# Patient Record
Sex: MALE | Race: BLACK OR AFRICAN AMERICAN | NOT HISPANIC OR LATINO | Employment: FULL TIME | URBAN - METROPOLITAN AREA
[De-identification: names, ages, dates, MRNs, and addresses within clinical notes are randomized per-mention and may not be internally consistent; named-entity substitution may affect disease eponyms.]

---

## 2017-06-05 ENCOUNTER — OFFICE VISIT (OUTPATIENT)
Dept: CARDIOLOGY | Facility: CLINIC | Age: 42
End: 2017-06-05

## 2017-06-05 VITALS
SYSTOLIC BLOOD PRESSURE: 132 MMHG | DIASTOLIC BLOOD PRESSURE: 90 MMHG | HEART RATE: 87 BPM | HEIGHT: 70 IN | WEIGHT: 202.6 LBS | BODY MASS INDEX: 29.01 KG/M2

## 2017-06-05 DIAGNOSIS — R00.2 PALPITATIONS: ICD-10-CM

## 2017-06-05 DIAGNOSIS — R07.9 CHEST PAIN, UNSPECIFIED TYPE: Primary | ICD-10-CM

## 2017-06-05 DIAGNOSIS — R94.31 ABNORMAL EKG: ICD-10-CM

## 2017-06-05 PROBLEM — E66.9 MILD OBESITY: Status: ACTIVE | Noted: 2017-06-05

## 2017-06-05 PROCEDURE — 99214 OFFICE O/P EST MOD 30 MIN: CPT | Performed by: PHYSICIAN ASSISTANT

## 2017-06-05 PROCEDURE — 93225 XTRNL ECG REC<48 HRS REC: CPT | Performed by: INTERNAL MEDICINE

## 2017-06-05 RX ORDER — PANTOPRAZOLE SODIUM 40 MG/1
40 TABLET, DELAYED RELEASE ORAL DAILY
COMMUNITY
End: 2018-05-09

## 2017-06-05 RX ORDER — METOPROLOL SUCCINATE 25 MG/1
25 TABLET, EXTENDED RELEASE ORAL DAILY
COMMUNITY
End: 2018-05-09 | Stop reason: SDUPTHER

## 2017-06-05 NOTE — PROGRESS NOTES
[facility] at [facility] - Office Note  Bereket Palmer Jr.         [address] [facility] [address]  1975   [phone] (home)      LOCATION:  [place] office.  Visit Type: Follow Up.    PCP:  Yehuda Humphrey MD    06/05/17   Bereket Palmer Jr. is a 41 y.o. Single male  currently employed.      Chief Complaint: Follow up on chest pain. Complains of palpitations.  PROBLEM LIST:  1. Chest pains:   a. First episode 2003, with cardiac catheterization by Dr. Sixto Stack showing no evidence of coronary disease, normal LV function.   b. Recurrent episode 2010, with Medical management only. C  c. Recurrent episodes, requiring ER visitation, 11/12/2015: Ruling out for an MI, no change in EKG.   2. Abnormal EKG.   3. H/O Mild obesity with recent unintentional weight loss.  4. Palpitations.      No Known Allergies      Current Outpatient Prescriptions:   •  metoprolol succinate XL (TOPROL-XL) 25 MG 24 hr tablet, Take 25 mg by mouth Daily., Disp: , Rfl:   •  pantoprazole (PROTONIX) 40 MG EC tablet, Take 40 mg by mouth Daily., Disp: , Rfl:     HPI  Mr. Palmer is here today for an overdue follow up on history of chronic atypical chest pain and a chronic abnormal EKG.  From that standpoint, he's done fairly well.  Occasionally he will get a pain in his chest - radiates across both sides, unprovoked by activity or exertion.  His complaint today is palpitations.  He was in to see his PCP and mentioned skipped beats.  They come and go throughout the day. They are irregular but not fast.  His PCP did an EKG showing pre-excitation and he was placed on a beta blocker.  He has only been on the medication for 30 days.  His symptoms are better but have not completely resolved.  He is asking if he should continue on his current dose or have it increased.      The following portions of the patient's history were reviewed in the chart and updated as appropriate: allergies, current  "medications, past family history, past medical history, past social history, past surgical history and problem list.    Review of Systems   Constitution: Negative for weakness and malaise/fatigue.   Cardiovascular: Positive for chest pain, dyspnea on exertion and palpitations. Negative for leg swelling and syncope.   Respiratory: Positive for cough.    Gastrointestinal: Positive for heartburn.         height is 70\" (177.8 cm) and weight is 202 lb 9.6 oz (91.9 kg). His blood pressure is 132/90 and his pulse is 87.   Physical Exam   Constitutional: Vital signs are normal. He appears well-developed and well-nourished.   Cardiovascular: Normal rate, regular rhythm, S1 normal, S2 normal, normal heart sounds, intact distal pulses and normal pulses.    Pulmonary/Chest: Effort normal and breath sounds normal. He has no wheezes. He has no rhonchi. He has no rales.   Abdominal: Soft. Normal appearance and bowel sounds are normal. There is no hepatosplenomegaly.   Neurological: He is alert.         Procedures   EKG from PCP reviewed.  PVCs noted.  Assessment/ Plan     Chest pain, unspecified type:  Overall stable.  Continue with PPI for acid reflux which has helped in the past.  I think the beta blocker will also help.      Abnormal EKG:  I reviewed old EKGs and reviewed the EKG from PCP office, which shows unifocal PVCs.  Continue to monitor.    Palpitations:  I agree with the use of Toprol XL. I would not increase dose at this time.  I reviewed a copy of the EKG performed at PCP office.  I will place a 48 Hour Holter monitor to quantify and evaluation the pre-excitation.  RTC 1 year or sooner PRN.  I will call pt with results of the monitor.        Nevaeh Al PA-C  6/5/2017 9:20 AM      EMR Dragon/Transcription disclaimer:   Much of this encounter note is an electronic transcription/translation of spoken language to printed text. The electronic translation of spoken language may permit erroneous, or at times, " nonsensical words or phrases to be inadvertently transcribed; Although I have reviewed the note for such errors, some may still exist.

## 2017-06-13 ENCOUNTER — OFFICE VISIT (OUTPATIENT)
Dept: CARDIOLOGY | Facility: CLINIC | Age: 42
End: 2017-06-13

## 2017-06-13 DIAGNOSIS — R00.2 PALPITATIONS: ICD-10-CM

## 2017-06-13 PROCEDURE — 93227 XTRNL ECG REC<48 HR R&I: CPT | Performed by: INTERNAL MEDICINE

## 2018-05-03 ENCOUNTER — HOSPITAL ENCOUNTER (EMERGENCY)
Facility: HOSPITAL | Age: 43
Discharge: HOME | End: 2018-05-04
Attending: EMERGENCY MEDICINE | Admitting: EMERGENCY MEDICINE

## 2018-05-03 ENCOUNTER — APPOINTMENT (OUTPATIENT)
Dept: GENERAL RADIOLOGY | Facility: HOSPITAL | Age: 43
End: 2018-05-03

## 2018-05-03 DIAGNOSIS — R10.13 EPIGASTRIC PAIN: ICD-10-CM

## 2018-05-03 DIAGNOSIS — R07.9 CHEST PAIN, UNSPECIFIED TYPE: Primary | ICD-10-CM

## 2018-05-03 DIAGNOSIS — K21.9 CHRONIC GERD: ICD-10-CM

## 2018-05-03 LAB
BASOPHILS # BLD AUTO: 0.03 10*3/MM3 (ref 0–0.2)
BASOPHILS NFR BLD AUTO: 0.5 % (ref 0–1)
DEPRECATED RDW RBC AUTO: 38.4 FL (ref 37–54)
EOSINOPHIL # BLD AUTO: 0.1 10*3/MM3 (ref 0–0.3)
EOSINOPHIL NFR BLD AUTO: 1.7 % (ref 0–3)
ERYTHROCYTE [DISTWIDTH] IN BLOOD BY AUTOMATED COUNT: 12.9 % (ref 11.3–14.5)
HCT VFR BLD AUTO: 49.1 % (ref 38.9–50.9)
HGB BLD-MCNC: 17.4 G/DL (ref 13.1–17.5)
IMM GRANULOCYTES # BLD: 0.04 10*3/MM3 (ref 0–0.03)
IMM GRANULOCYTES NFR BLD: 0.7 % (ref 0–0.6)
LYMPHOCYTES # BLD AUTO: 1.74 10*3/MM3 (ref 0.6–4.8)
LYMPHOCYTES NFR BLD AUTO: 29.9 % (ref 24–44)
MCH RBC QN AUTO: 29.8 PG (ref 27–31)
MCHC RBC AUTO-ENTMCNC: 35.4 G/DL (ref 32–36)
MCV RBC AUTO: 84.1 FL (ref 80–99)
MONOCYTES # BLD AUTO: 0.45 10*3/MM3 (ref 0–1)
MONOCYTES NFR BLD AUTO: 7.7 % (ref 0–12)
NEUTROPHILS # BLD AUTO: 3.49 10*3/MM3 (ref 1.5–8.3)
NEUTROPHILS NFR BLD AUTO: 60.2 % (ref 41–71)
PLATELET # BLD AUTO: 181 10*3/MM3 (ref 150–450)
PMV BLD AUTO: 13.5 FL (ref 6–12)
RBC # BLD AUTO: 5.84 10*6/MM3 (ref 4.2–5.76)
TROPONIN I SERPL-MCNC: 0.03 NG/ML (ref 0–0.07)
WBC NRBC COR # BLD: 5.81 10*3/MM3 (ref 3.5–10.8)

## 2018-05-03 PROCEDURE — 83880 ASSAY OF NATRIURETIC PEPTIDE: CPT | Performed by: EMERGENCY MEDICINE

## 2018-05-03 PROCEDURE — 80053 COMPREHEN METABOLIC PANEL: CPT | Performed by: EMERGENCY MEDICINE

## 2018-05-03 PROCEDURE — 93005 ELECTROCARDIOGRAM TRACING: CPT | Performed by: EMERGENCY MEDICINE

## 2018-05-03 PROCEDURE — 71045 X-RAY EXAM CHEST 1 VIEW: CPT

## 2018-05-03 PROCEDURE — 84484 ASSAY OF TROPONIN QUANT: CPT

## 2018-05-03 PROCEDURE — 99284 EMERGENCY DEPT VISIT MOD MDM: CPT

## 2018-05-03 PROCEDURE — 85025 COMPLETE CBC W/AUTO DIFF WBC: CPT | Performed by: EMERGENCY MEDICINE

## 2018-05-03 PROCEDURE — 83690 ASSAY OF LIPASE: CPT | Performed by: EMERGENCY MEDICINE

## 2018-05-03 RX ORDER — RANITIDINE 150 MG/1
150 TABLET ORAL 2 TIMES DAILY
COMMUNITY
End: 2018-08-28

## 2018-05-03 RX ORDER — ALUMINA, MAGNESIA, AND SIMETHICONE 2400; 2400; 240 MG/30ML; MG/30ML; MG/30ML
15 SUSPENSION ORAL ONCE
Status: COMPLETED | OUTPATIENT
Start: 2018-05-03 | End: 2018-05-03

## 2018-05-03 RX ORDER — SODIUM CHLORIDE 0.9 % (FLUSH) 0.9 %
10 SYRINGE (ML) INJECTION AS NEEDED
Status: DISCONTINUED | OUTPATIENT
Start: 2018-05-03 | End: 2018-05-04 | Stop reason: HOSPADM

## 2018-05-03 RX ORDER — ASPIRIN 81 MG/1
324 TABLET, CHEWABLE ORAL ONCE
Status: COMPLETED | OUTPATIENT
Start: 2018-05-03 | End: 2018-05-03

## 2018-05-03 RX ADMIN — ASPIRIN 81 MG 324 MG: 81 TABLET ORAL at 23:40

## 2018-05-03 RX ADMIN — LIDOCAINE HYDROCHLORIDE 15 ML: 20 SOLUTION ORAL; TOPICAL at 23:41

## 2018-05-03 RX ADMIN — ALUMINUM HYDROXIDE, MAGNESIUM HYDROXIDE, AND DIMETHICONE 15 ML: 400; 400; 40 SUSPENSION ORAL at 23:41

## 2018-05-04 VITALS
OXYGEN SATURATION: 98 % | DIASTOLIC BLOOD PRESSURE: 85 MMHG | HEART RATE: 73 BPM | RESPIRATION RATE: 16 BRPM | BODY MASS INDEX: 28.63 KG/M2 | WEIGHT: 200 LBS | TEMPERATURE: 98 F | SYSTOLIC BLOOD PRESSURE: 128 MMHG | HEIGHT: 70 IN

## 2018-05-04 LAB
ALBUMIN SERPL-MCNC: 4.8 G/DL (ref 3.2–4.8)
ALBUMIN/GLOB SERPL: 1.5 G/DL (ref 1.5–2.5)
ALP SERPL-CCNC: 108 U/L (ref 25–100)
ALT SERPL W P-5'-P-CCNC: 29 U/L (ref 7–40)
ANION GAP SERPL CALCULATED.3IONS-SCNC: 7 MMOL/L (ref 3–11)
AST SERPL-CCNC: 28 U/L (ref 0–33)
BILIRUB SERPL-MCNC: 0.6 MG/DL (ref 0.3–1.2)
BNP SERPL-MCNC: 4 PG/ML (ref 0–100)
BUN BLD-MCNC: 13 MG/DL (ref 9–23)
BUN/CREAT SERPL: 11.8 (ref 7–25)
CALCIUM SPEC-SCNC: 9.7 MG/DL (ref 8.7–10.4)
CHLORIDE SERPL-SCNC: 99 MMOL/L (ref 99–109)
CO2 SERPL-SCNC: 30 MMOL/L (ref 20–31)
CREAT BLD-MCNC: 1.1 MG/DL (ref 0.6–1.3)
GFR SERPL CREATININE-BSD FRML MDRD: 90 ML/MIN/1.73
GLOBULIN UR ELPH-MCNC: 3.3 GM/DL
GLUCOSE BLD-MCNC: 90 MG/DL (ref 70–100)
HOLD SPECIMEN: NORMAL
HOLD SPECIMEN: NORMAL
LIPASE SERPL-CCNC: 31 U/L (ref 6–51)
POTASSIUM BLD-SCNC: 3.6 MMOL/L (ref 3.5–5.5)
PROT SERPL-MCNC: 8.1 G/DL (ref 5.7–8.2)
SODIUM BLD-SCNC: 136 MMOL/L (ref 132–146)
TROPONIN I SERPL-MCNC: 0.01 NG/ML (ref 0–0.07)
WHOLE BLOOD HOLD SPECIMEN: NORMAL
WHOLE BLOOD HOLD SPECIMEN: NORMAL

## 2018-05-04 PROCEDURE — 84484 ASSAY OF TROPONIN QUANT: CPT

## 2018-05-04 PROCEDURE — 93005 ELECTROCARDIOGRAM TRACING: CPT | Performed by: EMERGENCY MEDICINE

## 2018-05-04 RX ORDER — PANTOPRAZOLE SODIUM 40 MG/1
40 TABLET, DELAYED RELEASE ORAL DAILY
Qty: 30 TABLET | Refills: 0 | Status: SHIPPED | OUTPATIENT
Start: 2018-05-04 | End: 2018-05-09 | Stop reason: SDUPTHER

## 2018-05-04 NOTE — DISCHARGE INSTRUCTIONS
Follow up with Dr. Flores as previously scheduled on Monday.    I have included a referral to GI. Make an appointment as soon as possible.

## 2018-05-04 NOTE — ED PROVIDER NOTES
Subjective   42-year-old male presents complaining of chest pain and epigastric pain for the past 4 weeks.  He states that over that time span, he has been experiencing intermittent pain that he describes as sharp in nature.  The pain seems to be worse with eating.  He has a history of GERD for which she has been taking both omeprazole and Zantac over the past several weeks with minimal relief.  He endorses nausea but no vomiting.  No fevers.  No diaphoresis.  He is currently pain-free but states that the pain is quite bothersome when it comes on.  The patient has no cardiac risk factors.  He has an appointment with a cardiologist on Monday.        History provided by:  Patient and spouse  Chest Pain   Pain location:  L chest  Pain quality: burning and sharp    Duration:  4 weeks  Timing:  Intermittent  Progression:  Worsening  Chronicity:  New  Relieved by:  Nothing  Ineffective treatments:  Antacids  Associated symptoms: nausea    Associated symptoms: no fever and no vomiting    Risk factors: male sex    Risk factors: no diabetes mellitus, no high cholesterol, no hypertension, not obese and no smoking    Risk factors comment:  GERD      Review of Systems   Constitutional: Negative for fever.   Cardiovascular: Positive for chest pain.   Gastrointestinal: Positive for nausea. Negative for vomiting.   All other systems reviewed and are negative.      Past Medical History:   Diagnosis Date   • Chest pain 6/5/2017   • GERD (gastroesophageal reflux disease)    • Heart murmur    • Mild obesity 6/5/2017   • Palpitations        No Known Allergies    Past Surgical History:   Procedure Laterality Date   • CARDIAC CATHETERIZATION  2003    normal coronaries       History reviewed. No pertinent family history.    Social History     Social History   • Marital status: Single     Social History Main Topics   • Smoking status: Never Smoker   • Smokeless tobacco: Never Used   • Alcohol use 0.6 oz/week     1 Shots of liquor per week       Comment: monthly   • Drug use: No   • Sexual activity: Defer     Other Topics Concern   • Not on file         Objective   Physical Exam   Constitutional: He is oriented to person, place, and time. He appears well-developed and well-nourished. No distress.   Well-appearing male in no acute distress   HENT:   Head: Normocephalic and atraumatic.   Mouth/Throat: Oropharynx is clear and moist.   Neck: Normal range of motion. No JVD present.   Cardiovascular: Normal rate, regular rhythm and normal heart sounds.  Exam reveals no gallop and no friction rub.    No murmur heard.  Pulmonary/Chest: Effort normal and breath sounds normal. No respiratory distress. He has no wheezes. He has no rales.   Abdominal: Soft. Bowel sounds are normal. He exhibits no distension and no mass. There is no tenderness. There is no guarding.   No focal tenderness or peritoneal signs present, negative Sharif's sign   Musculoskeletal: Normal range of motion. He exhibits no edema.   Neurological: He is alert and oriented to person, place, and time.   Normal gait   Skin: Skin is warm and dry. No rash noted. He is not diaphoretic. No erythema. No pallor.   Psychiatric: He has a normal mood and affect. Judgment and thought content normal.   Nursing note and vitals reviewed.      Procedures         ED Course  ED Course   Comment By Time   42-year-old male presents complaining of intermittent chest pain and epigastric pain for the past 4 weeks.  Of note, the patient has a history of GERD and states that his symptoms seem to be brought on by eating.  He has no cardiac risk factors.  On arrival to the ED, patient well-appearing with benign exam.  Nonsurgical abdomen.  His initial EKG revealed normal sinus rhythm with heart rate of 96 and T-wave inversion noted in his inferior and precordial leads which is unchanged compared to prior EKGs.  We will obtain labs and a chest x-ray and reassess after initial interventions.  HEART score of 1. Kadeem Henson  MD Matthew 05/03 2348   Labs unrevealing.  Chest x-ray negative. Kadeem Castro MD 05/04 0045   Upon reevaluation, patient improved. Kadeem Castro MD 05/04 0047   Repeat troponin/EKG negative/unchanged.  Doubt ACS, PE, dissection, or emergent cardiothoracic process at this time based on exam, history, clinical presentation, and gestalt, and risk stratification.  The patient will follow-up with his cardiologist as previously scheduled on Monday.  However, I feel that his symptoms are most likely gastrointestinal in nature.  Patient referred to GI and will follow-up within 1 week.  Agreeable with plan and given appropriate strict return precautions. Kadeem Castro MD 05/04 0222     Recent Results (from the past 24 hour(s))   Comprehensive Metabolic Panel    Collection Time: 05/03/18 11:18 PM   Result Value Ref Range    Glucose 90 70 - 100 mg/dL    BUN 13 9 - 23 mg/dL    Creatinine 1.10 0.60 - 1.30 mg/dL    Sodium 136 132 - 146 mmol/L    Potassium 3.6 3.5 - 5.5 mmol/L    Chloride 99 99 - 109 mmol/L    CO2 30.0 20.0 - 31.0 mmol/L    Calcium 9.7 8.7 - 10.4 mg/dL    Total Protein 8.1 5.7 - 8.2 g/dL    Albumin 4.80 3.20 - 4.80 g/dL    ALT (SGPT) 29 7 - 40 U/L    AST (SGOT) 28 0 - 33 U/L    Alkaline Phosphatase 108 (H) 25 - 100 U/L    Total Bilirubin 0.6 0.3 - 1.2 mg/dL    eGFR  African Amer 90 >60 mL/min/1.73    Globulin 3.3 gm/dL    A/G Ratio 1.5 1.5 - 2.5 g/dL    BUN/Creatinine Ratio 11.8 7.0 - 25.0    Anion Gap 7.0 3.0 - 11.0 mmol/L   Lipase    Collection Time: 05/03/18 11:18 PM   Result Value Ref Range    Lipase 31 6 - 51 U/L   BNP    Collection Time: 05/03/18 11:18 PM   Result Value Ref Range    BNP 4.0 0.0 - 100.0 pg/mL   Light Blue Top    Collection Time: 05/03/18 11:18 PM   Result Value Ref Range    Extra Tube hold for add-on    Green Top (Gel)    Collection Time: 05/03/18 11:18 PM   Result Value Ref Range    Extra Tube Hold for add-ons.    Lavender Top    Collection Time: 05/03/18 11:18 PM   Result  Value Ref Range    Extra Tube hold for add-on    Gold Top - SST    Collection Time: 05/03/18 11:18 PM   Result Value Ref Range    Extra Tube Hold for add-ons.    CBC Auto Differential    Collection Time: 05/03/18 11:18 PM   Result Value Ref Range    WBC 5.81 3.50 - 10.80 10*3/mm3    RBC 5.84 (H) 4.20 - 5.76 10*6/mm3    Hemoglobin 17.4 13.1 - 17.5 g/dL    Hematocrit 49.1 38.9 - 50.9 %    MCV 84.1 80.0 - 99.0 fL    MCH 29.8 27.0 - 31.0 pg    MCHC 35.4 32.0 - 36.0 g/dL    RDW 12.9 11.3 - 14.5 %    RDW-SD 38.4 37.0 - 54.0 fl    MPV 13.5 (H) 6.0 - 12.0 fL    Platelets 181 150 - 450 10*3/mm3    Neutrophil % 60.2 41.0 - 71.0 %    Lymphocyte % 29.9 24.0 - 44.0 %    Monocyte % 7.7 0.0 - 12.0 %    Eosinophil % 1.7 0.0 - 3.0 %    Basophil % 0.5 0.0 - 1.0 %    Immature Grans % 0.7 (H) 0.0 - 0.6 %    Neutrophils, Absolute 3.49 1.50 - 8.30 10*3/mm3    Lymphocytes, Absolute 1.74 0.60 - 4.80 10*3/mm3    Monocytes, Absolute 0.45 0.00 - 1.00 10*3/mm3    Eosinophils, Absolute 0.10 0.00 - 0.30 10*3/mm3    Basophils, Absolute 0.03 0.00 - 0.20 10*3/mm3    Immature Grans, Absolute 0.04 (H) 0.00 - 0.03 10*3/mm3   POC Troponin, Rapid    Collection Time: 05/03/18 11:26 PM   Result Value Ref Range    Troponin I 0.03 0.00 - 0.07 ng/mL   POC Troponin, Rapid    Collection Time: 05/04/18  2:05 AM   Result Value Ref Range    Troponin I 0.01 0.00 - 0.07 ng/mL     Note: In addition to lab results from this visit, the labs listed above may include labs taken at another facility or during a different encounter within the last 24 hours. Please correlate lab times with ED admission and discharge times for further clarification of the services performed during this visit.    XR Chest 1 View   Final Result      No evidence of acute cardiopulmonary disease.      No significant interval adverse change since 11/12/2015.      THIS DOCUMENT HAS BEEN ELECTRONICALLY SIGNED BY JAMAR JANE MD        Vitals:    05/03/18 2306 05/03/18 2349   BP: 145/93 141/88   BP  "Location: Left arm Right arm   Patient Position: Sitting Lying   Pulse: 93 72   Resp: 16    Temp: 98 °F (36.7 °C)    TempSrc: Oral    SpO2: 97% 97%   Weight: 90.7 kg (200 lb)    Height: 177.8 cm (70\")      Medications   sodium chloride 0.9 % flush 10 mL (not administered)   aspirin chewable tablet 324 mg (324 mg Oral Given 5/3/18 2340)   aluminum-magnesium hydroxide-simethicone (MAALOX MAX) 400-400-40 MG/5ML suspension 15 mL (15 mL Oral Given 5/3/18 2341)   lidocaine viscous (XYLOCAINE) 2 % mouth solution 15 mL (15 mL Mouth/Throat Given 5/3/18 2341)     ECG/EMG Results (last 24 hours)     Procedure Component Value Units Date/Time    ECG 12 Lead [[id number]] Collected:  05/03/18 2313     Updated:  05/03/18 2314    ECG 12 Lead [[id number]] Collected:  05/04/18 0158     Updated:  05/04/18 0158                  HEART Score (for prediction of 6-week risk of major adverse cardiac event) reviewed and/or performed as part of the patient evaluation and treatment planning process.  The result associated with this review/performance is: 1           MDM    Final diagnoses:   Chest pain, unspecified type   Epigastric pain   Chronic GERD       Documentation assistance provided by aylin Barron.  Information recorded by the scriblizzy was done at my direction and has been verified and validated by me.        Raman Barron  05/03/18 2483       Kadeem Castro MD  05/04/18 8992    "

## 2018-05-09 ENCOUNTER — OFFICE VISIT (OUTPATIENT)
Dept: CARDIOLOGY | Facility: CLINIC | Age: 43
End: 2018-05-09

## 2018-05-09 VITALS
BODY MASS INDEX: 29.49 KG/M2 | HEIGHT: 70 IN | WEIGHT: 206 LBS | DIASTOLIC BLOOD PRESSURE: 68 MMHG | SYSTOLIC BLOOD PRESSURE: 122 MMHG | HEART RATE: 79 BPM

## 2018-05-09 DIAGNOSIS — R00.2 PALPITATIONS: ICD-10-CM

## 2018-05-09 DIAGNOSIS — R07.2 PRECORDIAL PAIN: Primary | ICD-10-CM

## 2018-05-09 PROCEDURE — 99213 OFFICE O/P EST LOW 20 MIN: CPT | Performed by: INTERNAL MEDICINE

## 2018-05-09 RX ORDER — METOPROLOL SUCCINATE 25 MG/1
25 TABLET, EXTENDED RELEASE ORAL DAILY
Qty: 90 TABLET | Refills: 1 | Status: SHIPPED | OUTPATIENT
Start: 2018-05-09 | End: 2019-03-26

## 2018-05-09 RX ORDER — PANTOPRAZOLE SODIUM 40 MG/1
40 TABLET, DELAYED RELEASE ORAL DAILY
Qty: 30 TABLET | Refills: 5 | Status: SHIPPED | OUTPATIENT
Start: 2018-05-09 | End: 2019-03-26

## 2018-05-09 NOTE — PROGRESS NOTES
[facility]  Office visit  Bereket Palmer Jr.  1975  [phone]    VISIT DATE:  05/09/2018    PCP: Jose Morse MD  [address] [address]    CC:  No chief complaint on file.      PROBLEM LIST:  1. Chest pains:   a. First episode 2003, with cardiac catheterization showing no evidence of coronary disease, normal LV function.   b. Recurrent episode 2010, with Medical management only.   c. Recurrent episodes, requiring ER visitation, 11/12/2015: Ruling out for an MI, no change in EKG.   2. Abnormal EKG.   3. H/O Mild obesity with recent unintentional weight loss.  4. Palpitations.    ASSESSMENT:   Diagnosis Plan   1. Precordial pain     2. Palpitations         PLAN:  Continue low-dose beta-blockade  Complex of symptoms overall concerning for GI etiology.  Have placed referral to gastroenterology.  Refilled proton pump inhibitor today.    Subjective  42-year-old gentleman with a history of recurrent palpitations and atypical chest pain.  These often flareup during times of stress.  Recent flare as his 8-year-old daughter has had to have multiple surgeries for spina bifida.  He reports onset of palpitations and epigastric discomfort radiating up into his chest that often occur right before meals and sometimes 2-3 hours after a meal.  These are nonexertional in nature.  No previous GI evaluation.  EKG is abnormal at baseline but stable, likely of variant of normal in this -American young man.  Previous normal stress echocardiogram in 2015.    PHYSICAL EXAMINATION:  There were no vitals filed for this visit.  General Appearance:    Alert, cooperative, no distress, appears stated age   Head:    Normocephalic, without obvious abnormality, atraumatic   Eyes:    conjunctiva/corneas clear   Nose:   Nares normal, septum midline, mucosa normal, no drainage   Throat:   Lips, teeth and gums normal   Neck:   Supple, symmetrical, trachea midline, no carotid     bruit or JVD   Lungs:     Clear to auscultation bilaterally, respirations unlabored   Chest Wall:    No tenderness or deformity    Heart:    Regular rate and rhythm, S1 and S2 normal, no murmur, rub   or gallop, normal carotid impulse bilaterally without bruit.   Abdomen:     Soft, non-tender   Extremities:   Extremities normal, atraumatic, no cyanosis or edema   Pulses:   2+ and symmetric all extremities   Skin:   Skin color, texture, turgor normal, no rashes or lesions       Diagnostic Data:  Procedures  No results found for: CHLPL, TRIG, HDL, LDLDIRECT  Lab Results   Component Value Date    GLUCOSE 90 05/03/2018    BUN 13 05/03/2018    CREATININE 1.10 05/03/2018     05/03/2018    K 3.6 05/03/2018    CL 99 05/03/2018    CO2 30.0 05/03/2018     No results found for: HGBA1C  Lab Results   Component Value Date    WBC 5.81 05/03/2018    HGB 17.4 05/03/2018    HCT 49.1 05/03/2018     05/03/2018       Allergies  No Known Allergies    Current Medications    Current Outpatient Prescriptions:   •  metoprolol succinate XL (TOPROL-XL) 25 MG 24 hr tablet, Take 25 mg by mouth Daily., Disp: , Rfl:   •  pantoprazole (PROTONIX) 40 MG EC tablet, Take 40 mg by mouth Daily., Disp: , Rfl:   •  pantoprazole (PROTONIX) 40 MG EC tablet, Take 1 tablet by mouth Daily., Disp: 30 tablet, Rfl: 0  •  raNITIdine (ZANTAC) 150 MG tablet, Take 150 mg by mouth 2 (Two) Times a Day., Disp: , Rfl:           ROS  Review of Systems   Cardiovascular: Positive for chest pain and palpitations.   Respiratory: Negative for cough, shortness of breath and wheezing.        SOCIAL HX  Social History     Social History   • Marital status: Single     Spouse name: N/A   • Number of children: N/A   • Years of education: N/A     Occupational History   • Not on file.     Social History Main Topics   • Smoking status: Never Smoker   • Smokeless tobacco: Never Used   • Alcohol use 0.6 oz/week     1 Shots of liquor per week      Comment: monthly   • Drug  use: No   • Sexual activity: Defer     Other Topics Concern   • Not on file     Social History Narrative   • No narrative on file       FAMILY HX  No family history on file.          Jose Flores III, MD, FACC

## 2018-08-17 ENCOUNTER — TRANSCRIBE ORDERS (OUTPATIENT)
Dept: ADMINISTRATIVE | Facility: HOSPITAL | Age: 43
End: 2018-08-17

## 2018-08-17 DIAGNOSIS — R10.9 STOMACH ACHE: Primary | ICD-10-CM

## 2018-08-19 ENCOUNTER — HOSPITAL ENCOUNTER (OUTPATIENT)
Dept: ULTRASOUND IMAGING | Facility: HOSPITAL | Age: 43
Discharge: HOME | End: 2018-08-19
Attending: FAMILY MEDICINE | Admitting: FAMILY MEDICINE

## 2018-08-19 DIAGNOSIS — R10.9 STOMACH ACHE: ICD-10-CM

## 2018-08-19 PROCEDURE — 76700 US EXAM ABDOM COMPLETE: CPT

## 2018-08-28 ENCOUNTER — OFFICE VISIT (OUTPATIENT)
Dept: CARDIOLOGY | Facility: CLINIC | Age: 43
End: 2018-08-28

## 2018-08-28 VITALS
OXYGEN SATURATION: 95 % | HEART RATE: 76 BPM | BODY MASS INDEX: 28.92 KG/M2 | DIASTOLIC BLOOD PRESSURE: 82 MMHG | SYSTOLIC BLOOD PRESSURE: 118 MMHG | HEIGHT: 70 IN | WEIGHT: 202 LBS

## 2018-08-28 DIAGNOSIS — R00.2 PALPITATIONS: Primary | ICD-10-CM

## 2018-08-28 DIAGNOSIS — R07.2 PRECORDIAL PAIN: ICD-10-CM

## 2018-08-28 PROCEDURE — 99213 OFFICE O/P EST LOW 20 MIN: CPT | Performed by: INTERNAL MEDICINE

## 2018-08-28 NOTE — PROGRESS NOTES
[facility] at [facility]  Office visit  Bereket Palmer Jr.  1975  [phone]    VISIT DATE:  05/09/2018    PCP: Jose Morse MD  [address] [address]    CC:  Chief Complaint   Patient presents with   • Palpitations       PROBLEM LIST:  1. Chest pains:   a. First episode 2003, with cardiac catheterization showing no evidence of coronary disease, normal LV function.   b. Recurrent episode 2010, with Medical management only.   c. Recurrent episodes, requiring ER visitation, 11/12/2015: Ruling out for an MI, no change in EKG.   d. December 2015: Normal stress echocardiogram.  2. Abnormal EKG.   3. H/O Mild obesity with recent unintentional weight loss.  4. Palpitations.  a. June 2017 Holter-occasional premature ventricular contractions.  b. July 2018 Holter-average heart rate 80 bpm, rare PVCs, rare PACs, nocturnal bradycardia.    ASSESSMENT:   Diagnosis Plan   1. Palpitations     2. Precordial pain         PLAN:  Symptomatic premature ventricular contractions: Limited burden of arrhythmia on Holter.  Continue beta-blockade. Discussed pathophysiology of premature ventricular contractions today, offered reassurance.  Continue regular exercise.    Abnormal EKG: Suspect EKG changes are normal variant in this young -American gentleman.  Will attempt to retrieve echo imaging from previous stress echocardiogram performed in 2015 to make sure that he does not have any underlying hypertrophy.  If these are not available for review then wall repeat transthoracic echo at this time.  Would still be reasonable if normal at this time to repeat echo every 5 years to screen for development of underlying hypertrophic cardiomyopathy.    Hyperlipidemia: Continue dietary modifications and regular exercise.    Subjective  42-year-old gentleman with a history of recurrent palpitations and atypical chest pain.   Recent flare when his 8-year-old daughter had to have multiple surgeries  "for spina bifida.  Symptoms of chest discomfort resolved with dietary modification and proton pump inhibitor to treat gastroesophageal reflux disease.  EKG is abnormal at baseline but stable, likely of variant of normal in this -American young man.  Previous normal stress echocardiogram in 2015.  He had a recent flare and palpitations consistent with symptomatic premature ventricular contractions, this improved with increasing his Toprol from 25 mg by mouth daily to 50 mg by mouth daily.  He is begun to exercise on a regular basis and his blood pressures which were transiently borderline are now running less than 125/80 mmHg.  He has borderline dyslipidemia, currently following a healthy diet.      PHYSICAL EXAMINATION:  Vitals:    08/28/18 1125   BP: 118/82   BP Location: Right arm   Patient Position: Sitting   Pulse: 76   SpO2: 95%   Weight: 91.6 kg (202 lb)   Height: 177.8 cm (70\")     General Appearance:    Alert, cooperative, no distress, appears stated age   Head:    Normocephalic, without obvious abnormality, atraumatic   Eyes:    conjunctiva/corneas clear   Nose:   Nares normal, septum midline, mucosa normal, no drainage   Throat:   Lips, teeth and gums normal   Neck:   Supple, symmetrical, trachea midline, no carotid    bruit or JVD   Lungs:     Clear to auscultation bilaterally, respirations unlabored   Chest Wall:    No tenderness or deformity    Heart:    Regular rate and rhythm, S1 and S2 normal, no murmur, rub   or gallop, normal carotid impulse bilaterally without bruit.   Abdomen:     Soft, non-tender   Extremities:   Extremities normal, atraumatic, no cyanosis or edema   Pulses:   2+ and symmetric all extremities   Skin:   Skin color, texture, turgor normal, no rashes or lesions       Diagnostic Data:  Procedures  No results found for: CHLPL, TRIG, HDL, LDLDIRECT  Lab Results   Component Value Date    GLUCOSE 90 05/03/2018    BUN 13 05/03/2018    CREATININE 1.10 05/03/2018     " 05/03/2018    K 3.6 05/03/2018    CL 99 05/03/2018    CO2 30.0 05/03/2018     No results found for: HGBA1C  Lab Results   Component Value Date    WBC 5.81 05/03/2018    HGB 17.4 05/03/2018    HCT 49.1 05/03/2018     05/03/2018       Allergies  No Known Allergies    Current Medications    Current Outpatient Prescriptions:   •  metoprolol succinate XL (TOPROL-XL) 25 MG 24 hr tablet, Take 1 tablet by mouth Daily. (Patient taking differently: Take 50 mg by mouth Daily.), Disp: 90 tablet, Rfl: 1  •  pantoprazole (PROTONIX) 40 MG EC tablet, Take 1 tablet by mouth Daily., Disp: 30 tablet, Rfl: 5          ROS  Review of Systems   Cardiovascular: Negative for chest pain, dyspnea on exertion, irregular heartbeat and palpitations.   Respiratory: Negative for cough, shortness of breath and wheezing.        SOCIAL HX  Social History     Social History   • Marital status: Single     Spouse name: N/A   • Number of children: N/A   • Years of education: N/A     Occupational History   • Not on file.     Social History Main Topics   • Smoking status: Never Smoker   • Smokeless tobacco: Never Used   • Alcohol use 0.6 oz/week     1 Shots of liquor per week      Comment: monthly   • Drug use: No   • Sexual activity: Defer     Other Topics Concern   • Not on file     Social History Narrative   • No narrative on file       FAMILY HX  Family History   Problem Relation Age of Onset   • Breast cancer Mother    • Gallbladder disease Mother    • Depression Mother    • Gallbladder disease Father    • Depression Father    • Diabetes Father    • Asthma Sister              Jose Flores III, MD, FACC

## 2019-03-26 ENCOUNTER — OFFICE VISIT (OUTPATIENT)
Dept: CARDIOLOGY | Facility: CLINIC | Age: 44
End: 2019-03-26

## 2019-03-26 VITALS
WEIGHT: 206 LBS | DIASTOLIC BLOOD PRESSURE: 72 MMHG | OXYGEN SATURATION: 97 % | SYSTOLIC BLOOD PRESSURE: 128 MMHG | BODY MASS INDEX: 29.49 KG/M2 | HEART RATE: 82 BPM | HEIGHT: 70 IN

## 2019-03-26 DIAGNOSIS — R94.31 ABNORMAL EKG: ICD-10-CM

## 2019-03-26 DIAGNOSIS — R00.2 PALPITATIONS: Primary | ICD-10-CM

## 2019-03-26 PROCEDURE — 99213 OFFICE O/P EST LOW 20 MIN: CPT | Performed by: INTERNAL MEDICINE

## 2019-03-26 RX ORDER — METOPROLOL SUCCINATE 50 MG/1
50 TABLET, EXTENDED RELEASE ORAL DAILY
Qty: 90 TABLET | Refills: 4 | Status: SHIPPED | OUTPATIENT
Start: 2019-03-26 | End: 2019-10-22 | Stop reason: SDUPTHER

## 2019-03-26 NOTE — PROGRESS NOTES
[facility]  Office visit  Bereket Palmre Jr.  1975  [phone]    VISIT DATE:  05/09/2018    PCP: Jose Morse MD  [address] [address]    CC:  Chief Complaint   Patient presents with   • Precordial pain   • Palpitations       PROBLEM LIST:  1. Chest pains:   a. First episode 2003, with cardiac catheterization showing no evidence of coronary disease, normal LV function.   b. Recurrent episode 2010, with Medical management only.   c. Recurrent episodes, requiring ER visitation, 11/12/2015: Ruling out for an MI, no change in EKG.   d. December 2015: Normal stress echocardiogram.  2. Abnormal EKG.   3. H/O Mild obesity with recent unintentional weight loss.  4. Palpitations.  a. June 2017 Holter-occasional premature ventricular contractions.  b. July 2018 Holter-average heart rate 80 bpm, rare PVCs, rare PACs, nocturnal bradycardia.    ASSESSMENT:   Diagnosis Plan   1. Palpitations     2. Abnormal EKG         PLAN:  Symptomatic premature ventricular contractions: Limited burden of arrhythmia on Holter.  Continue beta-blockade.  Encouraged regular exercise and avoidance of sleep deprivation.    Abnormal EKG: Suspect EKG changes are normal variant in this young -American gentleman. Would still be reasonable if normal at this time to repeat echo every 5 years to screen for development of underlying hypertrophic cardiomyopathy.  Normal stress echo 2015, recommend repeat transthoracic echo next year.    Hyperlipidemia: Mildly elevated LDL, otherwise excellent lipid profile.  Continue dietary modifications and regular exercise.    Subjective  43-year-old gentleman with a history of recurrent palpitations and atypical chest pain.   Daughter has had to have multiple surgeries for spina bifida, this is an intermittent source of stress and can sometimes flareup his symptoms.  Symptoms of chest discomfort resolved with dietary modification and proton pump  "inhibitor to treat gastroesophageal reflux disease.  EKG is abnormal at baseline but stable, likely of variant of normal in this -American young man.  Previous normal stress echocardiogram in 2015.  His palpitations are well controlled as long as he takes his Toprol, he does notice episodes of skipped heartbeats if he misses a dose.  Blood pressures running less than 130/80 mmHg.      PHYSICAL EXAMINATION:  Vitals:    03/26/19 0859   BP: 128/72   BP Location: Right arm   Patient Position: Sitting   Pulse: 82   SpO2: 97%   Weight: 93.4 kg (206 lb)   Height: 177.8 cm (70\")     General Appearance:    Alert, cooperative, no distress, appears stated age   Head:    Normocephalic, without obvious abnormality, atraumatic   Eyes:    conjunctiva/corneas clear   Nose:   Nares normal, septum midline, mucosa normal, no drainage   Throat:   Lips, teeth and gums normal   Neck:   Supple, symmetrical, trachea midline, no carotid    bruit or JVD   Lungs:     Clear to auscultation bilaterally, respirations unlabored   Chest Wall:    No tenderness or deformity    Heart:    Regular rate and rhythm, S1 and S2 normal, no murmur, rub   or gallop, normal carotid impulse bilaterally without bruit.   Abdomen:     Soft, non-tender   Extremities:   Extremities normal, atraumatic, no cyanosis or edema   Pulses:   2+ and symmetric all extremities   Skin:   Skin color, texture, turgor normal, no rashes or lesions       Diagnostic Data:  Procedures  No results found for: CHLPL, TRIG, HDL, LDLDIRECT  Lab Results   Component Value Date    GLUCOSE 90 05/03/2018    BUN 13 05/03/2018    CREATININE 1.10 05/03/2018     05/03/2018    K 3.6 05/03/2018    CL 99 05/03/2018    CO2 30.0 05/03/2018     No results found for: HGBA1C  Lab Results   Component Value Date    WBC 5.81 05/03/2018    HGB 17.4 05/03/2018    HCT 49.1 05/03/2018     05/03/2018       Allergies  No Known Allergies    Current Medications    Current Outpatient Medications: "   •  metoprolol succinate XL (TOPROL-XL) 25 MG 24 hr tablet, Take 1 tablet by mouth Daily. (Patient taking differently: Take 50 mg by mouth Daily.), Disp: 90 tablet, Rfl: 1          ROS  Review of Systems   Cardiovascular: Negative for chest pain, dyspnea on exertion, irregular heartbeat and palpitations.   Respiratory: Negative for cough, shortness of breath and wheezing.        SOCIAL HX  Social History     Socioeconomic History   • Marital status: Single     Spouse name: Not on file   • Number of children: Not on file   • Years of education: Not on file   • Highest education level: Not on file   Tobacco Use   • Smoking status: Never Smoker   • Smokeless tobacco: Never Used   Substance and Sexual Activity   • Alcohol use: Yes     Alcohol/week: 0.6 oz     Types: 1 Shots of liquor per week     Comment: occas   • Drug use: No   • Sexual activity: Defer       FAMILY HX  Family History   Problem Relation Age of Onset   • Breast cancer Mother    • Gallbladder disease Mother    • Depression Mother    • Gallbladder disease Father    • Depression Father    • Diabetes Father    • Asthma Sister              Jose Flores III, MD, FACC

## 2019-10-22 ENCOUNTER — LAB (OUTPATIENT)
Dept: LAB | Facility: HOSPITAL | Age: 44
End: 2019-10-22

## 2019-10-22 ENCOUNTER — OFFICE VISIT (OUTPATIENT)
Dept: CARDIOLOGY | Facility: CLINIC | Age: 44
End: 2019-10-22

## 2019-10-22 VITALS
BODY MASS INDEX: 30.35 KG/M2 | OXYGEN SATURATION: 96 % | HEIGHT: 70 IN | WEIGHT: 212 LBS | SYSTOLIC BLOOD PRESSURE: 126 MMHG | DIASTOLIC BLOOD PRESSURE: 84 MMHG | HEART RATE: 72 BPM

## 2019-10-22 DIAGNOSIS — R94.31 ABNORMAL EKG: ICD-10-CM

## 2019-10-22 DIAGNOSIS — E78.2 MIXED HYPERLIPIDEMIA: ICD-10-CM

## 2019-10-22 DIAGNOSIS — R00.2 PALPITATIONS: Primary | ICD-10-CM

## 2019-10-22 PROCEDURE — 86141 C-REACTIVE PROTEIN HS: CPT

## 2019-10-22 PROCEDURE — 83695 ASSAY OF LIPOPROTEIN(A): CPT

## 2019-10-22 PROCEDURE — 99214 OFFICE O/P EST MOD 30 MIN: CPT | Performed by: INTERNAL MEDICINE

## 2019-10-22 PROCEDURE — 36415 COLL VENOUS BLD VENIPUNCTURE: CPT

## 2019-10-22 RX ORDER — METOPROLOL SUCCINATE 25 MG/1
25 TABLET, EXTENDED RELEASE ORAL DAILY
Qty: 90 TABLET | Refills: 1 | Status: SHIPPED | OUTPATIENT
Start: 2019-10-22 | End: 2020-07-06

## 2019-10-22 RX ORDER — METOPROLOL SUCCINATE 50 MG/1
50 TABLET, EXTENDED RELEASE ORAL DAILY
Qty: 30 TABLET | Refills: 0 | Status: SHIPPED | OUTPATIENT
Start: 2019-10-22 | End: 2020-07-14

## 2019-10-22 NOTE — PROGRESS NOTES
[facility] at [facility]  Office visit  Bereket Palmer Jr.  1975  [phone]    VISIT DATE:  10/22/2019      PCP: Jose Morse MD  [address] [address]    CC:  Chief Complaint   Patient presents with   • Precordial pain   • Palpitations       PROBLEM LIST:  1. Chest pains:   a. First episode 2003, with cardiac catheterization showing no evidence of coronary disease, normal LV function.   b. Recurrent episode 2010, with Medical management only.   c. Recurrent episodes, requiring ER visitation, 11/12/2015: Ruling out for an MI, no change in EKG.   d. December 2015: Normal stress echocardiogram.  2. Abnormal EKG.   3. H/O Mild obesity with recent unintentional weight loss.  4. Palpitations.  a. June 2017 Holter-occasional premature ventricular contractions.  b. July 2018 Holter-average heart rate 80 bpm, rare PVCs, rare PACs, nocturnal bradycardia.    ASSESSMENT:   Diagnosis Plan   1. Palpitations     2. Abnormal EKG         PLAN:  Symptomatic premature ventricular contractions: Limited burden of arrhythmia on Holter.  Continue beta-blockade, weaning to the lowest effective dose, decreasing to 25 mg p.o. daily in 1 month.  Encouraged regular exercise and avoidance of sleep deprivation.    Abnormal EKG: Suspect EKG changes are normal variant in this young -American gentleman.  Would be reasonable to screen for underlying occult cardiomyopathy every 5 years with transthoracic echo.  Normal stress echo 2015, recommend repeat transthoracic echo next year.    Hyperlipidemia: Increasing LDL, currently 166.  Recommending further risk stratification with high-sensitivity CRP and LP(a) along with coronary calcium score.  Encouraged predominant plant-based diet and regular exercise.    Subjective  44-year-old gentleman with a history of recurrent palpitations and atypical chest pain.   Daughter has had to have multiple surgeries for spina bifida, this is an intermittent  "source of stress and can sometimes flareup his symptoms.  Blood pressures are running less than 130/80 mmHg.  Had an episode of palpitations on Monday after forgetting his Sunday dose of Toprol.  Will switch to wean back to his lowest needed dose of Toprol.  His daughter has a 10-hour surgery coming up at the [facility] for bladder reconstruction and has some increased emotional stress due to that.  Reviewed most recent lipid profile.    PHYSICAL EXAMINATION:  Vitals:    10/22/19 0904   BP: 126/84   BP Location: Right arm   Patient Position: Sitting   Pulse: 72   SpO2: 96%   Weight: 96.2 kg (212 lb)   Height: 177.8 cm (70\")     General Appearance:    Alert, cooperative, no distress, appears stated age   Head:    Normocephalic, without obvious abnormality, atraumatic   Eyes:    conjunctiva/corneas clear   Nose:   Nares normal, septum midline, mucosa normal, no drainage   Throat:   Lips, teeth and gums normal   Neck:   Supple, symmetrical, trachea midline, no carotid    bruit or JVD   Lungs:     Clear to auscultation bilaterally, respirations unlabored   Chest Wall:    No tenderness or deformity    Heart:    Regular rate and rhythm, S1 and S2 normal, no murmur, rub   or gallop, normal carotid impulse bilaterally without bruit.   Abdomen:     Soft, non-tender   Extremities:   Extremities normal, atraumatic, no cyanosis or edema   Pulses:   2+ and symmetric all extremities   Skin:   Skin color, texture, turgor normal, no rashes or lesions       Diagnostic Data:  Procedures  No results found for: CHLPL, TRIG, HDL, LDLDIRECT  Lab Results   Component Value Date    GLUCOSE 90 05/03/2018    BUN 13 05/03/2018    CREATININE 1.10 05/03/2018     05/03/2018    K 3.6 05/03/2018    CL 99 05/03/2018    CO2 30.0 05/03/2018     No results found for: HGBA1C  Lab Results   Component Value Date    WBC 5.81 05/03/2018    HGB 17.4 05/03/2018    HCT 49.1 05/03/2018     05/03/2018       Allergies  No Known " Allergies    Current Medications    Current Outpatient Medications:   •  metoprolol succinate XL (TOPROL-XL) 50 MG 24 hr tablet, Take 1 tablet by mouth Daily., Disp: 90 tablet, Rfl: 4          ROS  Review of Systems   Cardiovascular: Positive for palpitations. Negative for chest pain, dyspnea on exertion and irregular heartbeat.   Respiratory: Negative for cough, shortness of breath and wheezing.        SOCIAL HX  Social History     Socioeconomic History   • Marital status: Single     Spouse name: Not on file   • Number of children: Not on file   • Years of education: Not on file   • Highest education level: Not on file   Tobacco Use   • Smoking status: Never Smoker   • Smokeless tobacco: Never Used   Substance and Sexual Activity   • Alcohol use: Yes     Alcohol/week: 0.6 oz     Types: 1 Shots of liquor per week     Comment: occas   • Drug use: No   • Sexual activity: Defer       FAMILY HX  Family History   Problem Relation Age of Onset   • Breast cancer Mother    • Gallbladder disease Mother    • Depression Mother    • Gallbladder disease Father    • Depression Father    • Diabetes Father    • Asthma Sister              Jose Flores III, MD, FACC

## 2019-10-23 LAB — CRP SERPL-MCNC: 0.2 MG/DL (ref 0.01–0.5)

## 2019-10-24 LAB — LPA SERPL-MCNC: 76.5 NMOL/L

## 2019-11-05 ENCOUNTER — TELEPHONE (OUTPATIENT)
Dept: CARDIOLOGY | Facility: CLINIC | Age: 44
End: 2019-11-05

## 2019-11-05 NOTE — TELEPHONE ENCOUNTER
----- Message from Jose Flores III, MD sent at 11/5/2019 10:57 AM EST -----  Excellent coronary calcium score, zero.

## 2020-01-09 ENCOUNTER — APPOINTMENT (OUTPATIENT)
Dept: GENERAL RADIOLOGY | Facility: HOSPITAL | Age: 45
End: 2020-01-09

## 2020-01-09 ENCOUNTER — HOSPITAL ENCOUNTER (EMERGENCY)
Facility: HOSPITAL | Age: 45
Discharge: HOME | End: 2020-01-09
Attending: EMERGENCY MEDICINE | Admitting: EMERGENCY MEDICINE

## 2020-01-09 ENCOUNTER — TELEPHONE (OUTPATIENT)
Dept: CARDIOLOGY | Facility: CLINIC | Age: 45
End: 2020-01-09

## 2020-01-09 VITALS
HEIGHT: 70 IN | TEMPERATURE: 98.2 F | SYSTOLIC BLOOD PRESSURE: 124 MMHG | WEIGHT: 213 LBS | OXYGEN SATURATION: 98 % | RESPIRATION RATE: 17 BRPM | BODY MASS INDEX: 30.49 KG/M2 | DIASTOLIC BLOOD PRESSURE: 75 MMHG | HEART RATE: 86 BPM

## 2020-01-09 DIAGNOSIS — R10.9 ABDOMINAL PAIN, UNSPECIFIED ABDOMINAL LOCATION: ICD-10-CM

## 2020-01-09 DIAGNOSIS — R07.89 ATYPICAL CHEST PAIN: Primary | ICD-10-CM

## 2020-01-09 LAB
ALBUMIN SERPL-MCNC: 5.3 G/DL (ref 3.5–5.2)
ALBUMIN/GLOB SERPL: 1.7 G/DL
ALP SERPL-CCNC: 101 U/L (ref 39–117)
ALT SERPL W P-5'-P-CCNC: 36 U/L (ref 1–41)
ANION GAP SERPL CALCULATED.3IONS-SCNC: 15 MMOL/L (ref 5–15)
AST SERPL-CCNC: 26 U/L (ref 1–40)
BASOPHILS # BLD AUTO: 0.02 10*3/MM3 (ref 0–0.2)
BASOPHILS NFR BLD AUTO: 0.3 % (ref 0–1.5)
BILIRUB SERPL-MCNC: 0.5 MG/DL (ref 0.2–1.2)
BUN BLD-MCNC: 11 MG/DL (ref 6–20)
BUN/CREAT SERPL: 10.8 (ref 7–25)
CALCIUM SPEC-SCNC: 11.2 MG/DL (ref 8.6–10.5)
CHLORIDE SERPL-SCNC: 94 MMOL/L (ref 98–107)
CO2 SERPL-SCNC: 27 MMOL/L (ref 22–29)
CREAT BLD-MCNC: 1.02 MG/DL (ref 0.76–1.27)
DEPRECATED RDW RBC AUTO: 38.5 FL (ref 37–54)
EOSINOPHIL # BLD AUTO: 0.01 10*3/MM3 (ref 0–0.4)
EOSINOPHIL NFR BLD AUTO: 0.2 % (ref 0.3–6.2)
ERYTHROCYTE [DISTWIDTH] IN BLOOD BY AUTOMATED COUNT: 12.4 % (ref 12.3–15.4)
GFR SERPL CREATININE-BSD FRML MDRD: 97 ML/MIN/1.73
GLOBULIN UR ELPH-MCNC: 3.2 GM/DL
GLUCOSE BLD-MCNC: 104 MG/DL (ref 65–99)
HCT VFR BLD AUTO: 50.4 % (ref 37.5–51)
HGB BLD-MCNC: 16.9 G/DL (ref 13–17.7)
HOLD SPECIMEN: NORMAL
HOLD SPECIMEN: NORMAL
IMM GRANULOCYTES # BLD AUTO: 0.04 10*3/MM3 (ref 0–0.05)
IMM GRANULOCYTES NFR BLD AUTO: 0.7 % (ref 0–0.5)
LIPASE SERPL-CCNC: 14 U/L (ref 13–60)
LYMPHOCYTES # BLD AUTO: 0.79 10*3/MM3 (ref 0.7–3.1)
LYMPHOCYTES NFR BLD AUTO: 13.1 % (ref 19.6–45.3)
MCH RBC QN AUTO: 28.7 PG (ref 26.6–33)
MCHC RBC AUTO-ENTMCNC: 33.5 G/DL (ref 31.5–35.7)
MCV RBC AUTO: 85.6 FL (ref 79–97)
MONOCYTES # BLD AUTO: 0.26 10*3/MM3 (ref 0.1–0.9)
MONOCYTES NFR BLD AUTO: 4.3 % (ref 5–12)
NEUTROPHILS # BLD AUTO: 4.92 10*3/MM3 (ref 1.7–7)
NEUTROPHILS NFR BLD AUTO: 81.4 % (ref 42.7–76)
NRBC BLD AUTO-RTO: 0 /100 WBC (ref 0–0.2)
NT-PROBNP SERPL-MCNC: 30 PG/ML (ref 5–450)
PLATELET # BLD AUTO: 173 10*3/MM3 (ref 140–450)
PMV BLD AUTO: 13.1 FL (ref 6–12)
POTASSIUM BLD-SCNC: 3.8 MMOL/L (ref 3.5–5.2)
PROT SERPL-MCNC: 8.5 G/DL (ref 6–8.5)
RBC # BLD AUTO: 5.89 10*6/MM3 (ref 4.14–5.8)
SODIUM BLD-SCNC: 136 MMOL/L (ref 136–145)
TROPONIN T SERPL-MCNC: <0.01 NG/ML (ref 0–0.03)
TROPONIN T SERPL-MCNC: <0.01 NG/ML (ref 0–0.03)
WBC NRBC COR # BLD: 6.04 10*3/MM3 (ref 3.4–10.8)
WHOLE BLOOD HOLD SPECIMEN: NORMAL
WHOLE BLOOD HOLD SPECIMEN: NORMAL

## 2020-01-09 PROCEDURE — 99285 EMERGENCY DEPT VISIT HI MDM: CPT

## 2020-01-09 PROCEDURE — 84484 ASSAY OF TROPONIN QUANT: CPT | Performed by: EMERGENCY MEDICINE

## 2020-01-09 PROCEDURE — 71045 X-RAY EXAM CHEST 1 VIEW: CPT

## 2020-01-09 PROCEDURE — 85025 COMPLETE CBC W/AUTO DIFF WBC: CPT | Performed by: EMERGENCY MEDICINE

## 2020-01-09 PROCEDURE — 96374 THER/PROPH/DIAG INJ IV PUSH: CPT

## 2020-01-09 PROCEDURE — 25010000002 ONDANSETRON PER 1 MG: Performed by: EMERGENCY MEDICINE

## 2020-01-09 PROCEDURE — 83880 ASSAY OF NATRIURETIC PEPTIDE: CPT | Performed by: EMERGENCY MEDICINE

## 2020-01-09 PROCEDURE — 93005 ELECTROCARDIOGRAM TRACING: CPT | Performed by: EMERGENCY MEDICINE

## 2020-01-09 PROCEDURE — 96361 HYDRATE IV INFUSION ADD-ON: CPT

## 2020-01-09 PROCEDURE — 80053 COMPREHEN METABOLIC PANEL: CPT | Performed by: EMERGENCY MEDICINE

## 2020-01-09 PROCEDURE — 83690 ASSAY OF LIPASE: CPT | Performed by: EMERGENCY MEDICINE

## 2020-01-09 RX ORDER — SODIUM CHLORIDE 0.9 % (FLUSH) 0.9 %
10 SYRINGE (ML) INJECTION AS NEEDED
Status: DISCONTINUED | OUTPATIENT
Start: 2020-01-09 | End: 2020-01-09 | Stop reason: HOSPADM

## 2020-01-09 RX ORDER — ALUMINA, MAGNESIA, AND SIMETHICONE 2400; 2400; 240 MG/30ML; MG/30ML; MG/30ML
15 SUSPENSION ORAL ONCE
Status: COMPLETED | OUTPATIENT
Start: 2020-01-09 | End: 2020-01-09

## 2020-01-09 RX ORDER — ONDANSETRON 4 MG/1
4 TABLET, FILM COATED ORAL EVERY 6 HOURS PRN
Qty: 15 TABLET | Refills: 0 | Status: SHIPPED | OUTPATIENT
Start: 2020-01-09 | End: 2020-07-14

## 2020-01-09 RX ORDER — ONDANSETRON 2 MG/ML
4 INJECTION INTRAMUSCULAR; INTRAVENOUS ONCE
Status: COMPLETED | OUTPATIENT
Start: 2020-01-09 | End: 2020-01-09

## 2020-01-09 RX ORDER — LIDOCAINE HYDROCHLORIDE 20 MG/ML
15 SOLUTION OROPHARYNGEAL ONCE
Status: COMPLETED | OUTPATIENT
Start: 2020-01-09 | End: 2020-01-09

## 2020-01-09 RX ORDER — ASPIRIN 81 MG/1
324 TABLET, CHEWABLE ORAL ONCE
Status: COMPLETED | OUTPATIENT
Start: 2020-01-09 | End: 2020-01-09

## 2020-01-09 RX ADMIN — ALUMINUM HYDROXIDE, MAGNESIUM HYDROXIDE, AND DIMETHICONE 15 ML: 400; 400; 40 SUSPENSION ORAL at 13:54

## 2020-01-09 RX ADMIN — SODIUM CHLORIDE 1000 ML: 9 INJECTION, SOLUTION INTRAVENOUS at 13:54

## 2020-01-09 RX ADMIN — ASPIRIN 81 MG 324 MG: 81 TABLET ORAL at 12:48

## 2020-01-09 RX ADMIN — ONDANSETRON 4 MG: 2 INJECTION INTRAMUSCULAR; INTRAVENOUS at 13:55

## 2020-01-09 RX ADMIN — LIDOCAINE HYDROCHLORIDE 15 ML: 20 SOLUTION ORAL; TOPICAL at 13:54

## 2020-01-09 NOTE — ED PROVIDER NOTES
Subjective   This patient is a very nice 44-year-old -American male who comes in today with chest pain that started at 1:00 in the morning.  He tells me the chest pain resolved relatively soon thereafter but he developed abdominal pain, diarrhea, and continued symptoms and ultimately went to see his primary care physician.  According to triage note, patient was sent from his primary care physician's office, Dr. Jose Fisher, here for evaluation with concern for EKG changes.  Patient is quick to tell me that he has a history of T wave inversion and that his EKG is not acute.  He tells me this is relatively standard for him.  I was able to compare his EKG to an EKG from May 2018.  He no longer has ST segment elevation in the lateral leads and the T wave inversion does appear to be somewhat new despite the fact that the patient tells me he has a history of T wave inversion.  Patient is quick also to tell me that he has no current chest pain or shortness of breath.  His only real complaint is some mild epigastric discomfort.  He tells me he has a history of dyspepsia and has PPIs prescribed but he does not take them.  When asked why he does not take them, he tells me he had not been having symptoms.  He tells me he should be eating and drinking better than he does not should be exercising, which he is not doing.  He is accompanied by his wife who confirms the aforementioned story.  In summary, we have a 44-year-old gentleman sent here from his PCP office for EKG changes with a history of chest pain earlier this morning that went away and with a history of abdominal discomfort and diarrhea that started at approximately 2 or so this morning.    Past Medical History: No history of CAD or DM    Past Family History: +DM - father      History provided by:  Patient  Chest Pain   Pain location:  Substernal area  Pain radiates to:  Does not radiate  Pain severity:  Moderate  Onset quality:  Sudden  Duration:  12  hours  Timing:  Intermittent  Progression:  Waxing and waning  Chronicity:  New  Relieved by: bowel movement.  Worsened by:  Nothing  Associated symptoms: abdominal pain    Associated symptoms: no fatigue, no fever, no headache, no nausea, no palpitations, no shortness of breath and no vomiting    Risk factors: male sex    Risk factors: no diabetes mellitus        Review of Systems   Constitutional: Negative.  Negative for chills, fatigue, fever and unexpected weight change.   HENT: Negative for dental problem, ear pain, hearing loss and sinus pressure.    Eyes: Negative for pain and visual disturbance.   Respiratory: Negative for chest tightness and shortness of breath.    Cardiovascular: Positive for chest pain. Negative for palpitations and leg swelling.   Gastrointestinal: Positive for abdominal pain and diarrhea. Negative for nausea, rectal pain and vomiting.   Genitourinary: Negative for difficulty urinating, dysuria, frequency, hematuria and urgency.   Musculoskeletal: Negative for myalgias, neck pain and neck stiffness.   Neurological: Negative for seizures, syncope, speech difficulty, light-headedness and headaches.   Psychiatric/Behavioral: Negative for confusion.   All other systems reviewed and are negative.      Past Medical History:   Diagnosis Date   • Chest pain 6/5/2017   • GERD (gastroesophageal reflux disease)    • Heart murmur    • Mild obesity 6/5/2017   • Palpitations        No Known Allergies    Past Surgical History:   Procedure Laterality Date   • CARDIAC CATHETERIZATION  2003    normal coronaries       Family History   Problem Relation Age of Onset   • Breast cancer Mother    • Gallbladder disease Mother    • Depression Mother    • Gallbladder disease Father    • Depression Father    • Diabetes Father    • Asthma Sister        Social History     Socioeconomic History   • Marital status: Single     Spouse name: Not on file   • Number of children: Not on file   • Years of education: Not on  file   • Highest education level: Not on file   Tobacco Use   • Smoking status: Never Smoker   • Smokeless tobacco: Never Used   Substance and Sexual Activity   • Alcohol use: Yes     Alcohol/week: 1.0 standard drinks     Types: 1 Shots of liquor per week     Comment: occas   • Drug use: No   • Sexual activity: Defer         Objective   Physical Exam   Constitutional: He is oriented to person, place, and time. He appears well-developed.  Non-toxic appearance. No distress.   HENT:   Head: Normocephalic and atraumatic.   Right Ear: Tympanic membrane, external ear and ear canal normal.   Left Ear: Tympanic membrane, external ear and ear canal normal.   Nose: Nose normal. No nasal septal hematoma.   Mouth/Throat: Oropharynx is clear and moist. Mucous membranes are not dry. No oral lesions. No trismus in the jaw. No dental abscesses or uvula swelling. No posterior oropharyngeal erythema or tonsillar abscesses. No tonsillar exudate.   Eyes: Pupils are equal, round, and reactive to light. EOM are normal. Right conjunctiva is not injected. Left conjunctiva is not injected.   Neck: Normal range of motion. Neck supple. No JVD present. No tracheal tenderness present. No neck rigidity. Normal range of motion present.   Cardiovascular: Normal rate, regular rhythm and normal heart sounds. Exam reveals no gallop and no friction rub.   Pulmonary/Chest: Effort normal and breath sounds normal. He has no wheezes. He has no rales. He exhibits no tenderness.   Abdominal: Soft. Bowel sounds are normal. He exhibits no distension, no pulsatile midline mass and no mass. There is no tenderness. There is no rigidity, no rebound, no guarding and no tenderness at McBurney's point.   No signs of acute abdomen.  No pain at McBurney's point.  No pulsatile abdominal mass   Musculoskeletal: Normal range of motion. He exhibits no edema, tenderness or deformity.   Lymphadenopathy:     He has no cervical adenopathy.   Neurological: He is alert and  oriented to person, place, and time. He has normal strength. He displays no tremor. No cranial nerve deficit or sensory deficit. He exhibits normal muscle tone.   5/5 strength bilaterally with flexion and extension of fingers, wrist, elbows, knees and hips as well as plantar and dorsiflexion of the foot.   Skin: Skin is warm and dry. No rash noted. No erythema.   Psychiatric: He has a normal mood and affect. His speech is normal and behavior is normal. Judgment and thought content normal. He is attentive.   Nursing note and vitals reviewed.      Procedures         ED Course  ED Course as of Jan 09 1556   Thu Jan 09, 2020   1313 The patient struck me is quite nervous and in fact the nursing staff confirm that his wife indicated he was very nervous about his symptoms at.  I tried to set his mind at ease and told him that we would take great care of him.  EKG does look abnormal with T wave inversion anteriorly and inferiorly.  Patient indicates this is normal for him.  We will compare to old EKGs.  I also plan to discuss with his cardiologist, Dr. Jose Flores.  Because of his dyspepsia story and history of PPI prescription with poor compliance, I have ordered a GI cocktail, Zofran and IV fluid rehydration.  We will get his chest x-ray and labs back.  If these are unremarkable as anticipated, I plan to have cardiology see the patient and do anticipate ultimate discharge home.  Patient is agreeable to the plan and without question or complaint.  Final disposition and plan following completion of work-up.    [CALEB]   1322 Dr. Araujo is bedside reevaluating the patient, discussing his results, and updating him on the treatment plan.     [TB]   1344 I discussed the case personally with Dr. Jose Flores.  He knows the patient well.  We reviewed his EKG personally.  Although does look a little more pronounced from a T wave inversion compared to 2018, I believe it is relatively consistent with his 2010 EKG and Dr. Flores agrees.   Dr. Flores would like to get a stress echo on the patient and we will schedule this personally and have the patient called.  He also agree with my plan to get a second set of cardiac enzymes here and if normal, have the patient follow-up as an outpatient with cardiology and also with GI.  We will refer him to GI here, Dr. Paredes, in 1 week for discussion of potential EGD.  He will follow-up with Dr. Jose Flores in 1 week or as scheduled by cardiology.  He should continue taking medications as prescribed including PPI.  Patient will be discharged home assuming second troponin normal.  For troponin is unremarkable.  Lipase 14.  BNP 30.  CBC and CMP essentially unremarkable.    [CALEB]   1345 Chest x-ray shows no evidence of acute abnormality.    [CALEB]   1346 Dr. Araujo is bedside reevaluating the patient, discussing his results, and updating him on the treatment plan.     [TB]   1435 Dr. Araujo is bedside reevaluating the patient, discussing the results, and updating him on the treatment plan.     [TB]   1508 Patient feeling much better.  I had a very nice conversation with him at the time of discharge.  Second set has been collected and if normal, as anticipated, patient will be discharged home.  Impression will include atypical chest pain, abdominal pain unspecified.  Plan will include Zofran, avoid fatty greasy or spicy foods.  Follow-up with GI in 1 week.  Follow-up with Dr. Flores in 1 week.  Outpatient stress echo as scheduled by cardiology.  Take PPI as already prescribed.  Return immediately for new or changing concerns.  Patient and his wife are appreciative for care and without questions or complaint and following completion of second troponin, and assuming it is negative, patient will be discharged home accordingly.    [CALEB]      ED Course User Index  [CALEB] Stanislaw Araujo MD  [TB] Kings Bone     Recent Results (from the past 24 hour(s))   Troponin    Collection Time: 01/09/20 12:44 PM   Result Value Ref  Range    Troponin T <0.010 0.000 - 0.030 ng/mL   Comprehensive Metabolic Panel    Collection Time: 01/09/20 12:44 PM   Result Value Ref Range    Glucose 104 (H) 65 - 99 mg/dL    BUN 11 6 - 20 mg/dL    Creatinine 1.02 0.76 - 1.27 mg/dL    Sodium 136 136 - 145 mmol/L    Potassium 3.8 3.5 - 5.2 mmol/L    Chloride 94 (L) 98 - 107 mmol/L    CO2 27.0 22.0 - 29.0 mmol/L    Calcium 11.2 (H) 8.6 - 10.5 mg/dL    Total Protein 8.5 6.0 - 8.5 g/dL    Albumin 5.30 (H) 3.50 - 5.20 g/dL    ALT (SGPT) 36 1 - 41 U/L    AST (SGOT) 26 1 - 40 U/L    Alkaline Phosphatase 101 39 - 117 U/L    Total Bilirubin 0.5 0.2 - 1.2 mg/dL    eGFR  African Amer 97 >60 mL/min/1.73    Globulin 3.2 gm/dL    A/G Ratio 1.7 g/dL    BUN/Creatinine Ratio 10.8 7.0 - 25.0    Anion Gap 15.0 5.0 - 15.0 mmol/L   Lipase    Collection Time: 01/09/20 12:44 PM   Result Value Ref Range    Lipase 14 13 - 60 U/L   BNP    Collection Time: 01/09/20 12:44 PM   Result Value Ref Range    proBNP 30.0 5.0 - 450.0 pg/mL   Light Blue Top    Collection Time: 01/09/20 12:44 PM   Result Value Ref Range    Extra Tube hold for add-on    Green Top (Gel)    Collection Time: 01/09/20 12:44 PM   Result Value Ref Range    Extra Tube Hold for add-ons.    Lavender Top    Collection Time: 01/09/20 12:44 PM   Result Value Ref Range    Extra Tube hold for add-on    Gold Top - SST    Collection Time: 01/09/20 12:44 PM   Result Value Ref Range    Extra Tube Hold for add-ons.    CBC Auto Differential    Collection Time: 01/09/20 12:44 PM   Result Value Ref Range    WBC 6.04 3.40 - 10.80 10*3/mm3    RBC 5.89 (H) 4.14 - 5.80 10*6/mm3    Hemoglobin 16.9 13.0 - 17.7 g/dL    Hematocrit 50.4 37.5 - 51.0 %    MCV 85.6 79.0 - 97.0 fL    MCH 28.7 26.6 - 33.0 pg    MCHC 33.5 31.5 - 35.7 g/dL    RDW 12.4 12.3 - 15.4 %    RDW-SD 38.5 37.0 - 54.0 fl    MPV 13.1 (H) 6.0 - 12.0 fL    Platelets 173 140 - 450 10*3/mm3    Neutrophil % 81.4 (H) 42.7 - 76.0 %    Lymphocyte % 13.1 (L) 19.6 - 45.3 %    Monocyte % 4.3  (L) 5.0 - 12.0 %    Eosinophil % 0.2 (L) 0.3 - 6.2 %    Basophil % 0.3 0.0 - 1.5 %    Immature Grans % 0.7 (H) 0.0 - 0.5 %    Neutrophils, Absolute 4.92 1.70 - 7.00 10*3/mm3    Lymphocytes, Absolute 0.79 0.70 - 3.10 10*3/mm3    Monocytes, Absolute 0.26 0.10 - 0.90 10*3/mm3    Eosinophils, Absolute 0.01 0.00 - 0.40 10*3/mm3    Basophils, Absolute 0.02 0.00 - 0.20 10*3/mm3    Immature Grans, Absolute 0.04 0.00 - 0.05 10*3/mm3    nRBC 0.0 0.0 - 0.2 /100 WBC   Troponin    Collection Time: 01/09/20  3:02 PM   Result Value Ref Range    Troponin T <0.010 0.000 - 0.030 ng/mL     Note: In addition to lab results from this visit, the labs listed above may include labs taken at another facility or during a different encounter within the last 24 hours. Please correlate lab times with ED admission and discharge times for further clarification of the services performed during this visit.    XR Chest 1 View   Preliminary Result   No acute cardiopulmonary process.       D:  01/09/2020   E:  01/09/2020            Vitals:    01/09/20 1300 01/09/20 1330 01/09/20 1400 01/09/20 1430   BP: 151/79 135/80 124/78 124/75   Pulse: 94 81 82 81   Resp:       Temp:       SpO2: 98% 94% 98% 93%   Weight:       Height:         Medications   sodium chloride 0.9 % flush 10 mL (has no administration in time range)   aspirin chewable tablet 324 mg (324 mg Oral Given 1/9/20 1248)   aluminum-magnesium hydroxide-simethicone (MAALOX MAX) 400-400-40 MG/5ML suspension 15 mL (15 mL Oral Given 1/9/20 1354)   Lidocaine Viscous HCl (XYLOCAINE) 2 % mouth solution 15 mL (15 mL Mouth/Throat Given 1/9/20 1354)   ondansetron (ZOFRAN) injection 4 mg (4 mg Intravenous Given 1/9/20 1355)   sodium chloride 0.9 % bolus 1,000 mL (1,000 mL Intravenous New Bag 1/9/20 1354)     ECG/EMG Results (last 24 hours)     Procedure Component Value Units Date/Time    ECG 12 Lead [[id number]] Collected:  01/09/20 1233     Updated:  01/09/20 1303        ECG 12 Lead         ECG 12 Lead    Final Result   Test Reason : chest pain   Blood Pressure : **/** mmHG   Vent. Rate : 096 BPM     Atrial Rate : 096 BPM      P-R Int : 164 ms          QRS Dur : 078 ms       QT Int : 320 ms       P-R-T Axes : 068 065 -71 degrees      QTc Int : 404 ms      Normal sinus rhythm   ST & T wave abnormality, consider inferior ischemia   ST & T wave abnormality, consider anterolateral ischemia   Abnormal ECG   When compared with ECG of 04-MAY-2018 01:58,   ST no longer elevated in Lateral leads   Inverted T waves have replaced nonspecific T wave abnormality in Lateral   leads   Confirmed by FE ARAUJO MD ([id number]) on 1/9/2020 2:04:10 PM      Referred By:  EDMD           Confirmed By:FE ARAUJO MD                          [facility]    Final diagnoses:   Atypical chest pain   Abdominal pain, unspecified abdominal location       Documentation assistance provided by aylin Bone.  Information recorded by the scribe was done at my direction and has been verified and validated by me.     Kings Bone  01/09/20 1313       Kings Bone  01/09/20 9503       Fe Araujo MD  01/09/20 5923

## 2020-01-09 NOTE — TELEPHONE ENCOUNTER
Patient came by office requesting appt to see . Notified of  in outside clinic today. Stated abdominal pain and diarrhea since yesterday. Also, stated chest discomfort on and off since yesterday. No other symptoms reported. Denied chest pain at present time. Instructed him to go to his PCP for further evaluation of GI symptoms and instructed him to go to the ED if he develops chest pain/discomfort again. Verbalized understanding. Please advise for any further instructions.

## 2020-01-09 NOTE — DISCHARGE INSTRUCTIONS
Take Zofran and PPI (proton pump inhibitor) as prescribed.    Avoid fatty and greasy foods.     Follow up with Dr. Flores as instructed. His office will call you for scheduling of your stress / echo.    Immediately return to the ER if you develop any new or worsening symptoms.      Please review the medications you are supposed to be taking according to prior physician recommendations. I have not changed your home medications during this visit. If your discharge instructions indicate that I have changed your home medications, this is not the case, and you should disregard. If you have any questions about the medication you should be taking at home, please call your physician.

## 2020-01-10 DIAGNOSIS — R94.31 ABNORMAL EKG: Primary | ICD-10-CM

## 2020-01-10 DIAGNOSIS — R07.9 CHEST PAIN, UNSPECIFIED TYPE: ICD-10-CM

## 2020-07-06 RX ORDER — METOPROLOL SUCCINATE 25 MG/1
TABLET, EXTENDED RELEASE ORAL
Qty: 90 TABLET | Refills: 0 | Status: SHIPPED | OUTPATIENT
Start: 2020-07-06 | End: 2020-10-01

## 2020-07-14 ENCOUNTER — OFFICE VISIT (OUTPATIENT)
Dept: CARDIOLOGY | Facility: CLINIC | Age: 45
End: 2020-07-14

## 2020-07-14 ENCOUNTER — LAB (OUTPATIENT)
Dept: LAB | Facility: HOSPITAL | Age: 45
End: 2020-07-14

## 2020-07-14 VITALS
WEIGHT: 214 LBS | HEIGHT: 70 IN | OXYGEN SATURATION: 97 % | SYSTOLIC BLOOD PRESSURE: 134 MMHG | DIASTOLIC BLOOD PRESSURE: 82 MMHG | BODY MASS INDEX: 30.64 KG/M2 | HEART RATE: 75 BPM

## 2020-07-14 DIAGNOSIS — R00.2 PALPITATIONS: ICD-10-CM

## 2020-07-14 DIAGNOSIS — I49.3 PVC (PREMATURE VENTRICULAR CONTRACTION): ICD-10-CM

## 2020-07-14 DIAGNOSIS — R07.2 PRECORDIAL PAIN: ICD-10-CM

## 2020-07-14 DIAGNOSIS — E78.2 MIXED HYPERLIPIDEMIA: Primary | ICD-10-CM

## 2020-07-14 LAB
CHOLEST SERPL-MCNC: 203 MG/DL (ref 0–200)
HDLC SERPL-MCNC: 51 MG/DL (ref 40–60)
LDLC SERPL CALC-MCNC: 136 MG/DL (ref 0–100)
LDLC/HDLC SERPL: 2.67 {RATIO}
TRIGL SERPL-MCNC: 78 MG/DL (ref 0–150)
VLDLC SERPL-MCNC: 15.6 MG/DL (ref 5–40)

## 2020-07-14 PROCEDURE — 80061 LIPID PANEL: CPT | Performed by: INTERNAL MEDICINE

## 2020-07-14 PROCEDURE — 99213 OFFICE O/P EST LOW 20 MIN: CPT | Performed by: INTERNAL MEDICINE

## 2020-07-14 PROCEDURE — 36415 COLL VENOUS BLD VENIPUNCTURE: CPT | Performed by: INTERNAL MEDICINE

## 2020-07-14 NOTE — PROGRESS NOTES
[facility] at [facility]  Office visit  Bereket Palmer Jr.  1975  [phone]    VISIT DATE:  7/14/2020      PCP: Jose Morse MD  [id number] [address]    CC:  Chief Complaint   Patient presents with   • Palpitations       PROBLEM LIST:  1. Chest pains:   a. First episode 2003, with cardiac catheterization showing no evidence of coronary disease, normal LV function.   b. Recurrent episode 2010, with Medical management only.   c. Recurrent episodes, requiring ER visitation, 11/12/2015: Ruling out for an MI, no change in EKG.   d. December 2015: Normal stress echocardiogram.  2. Abnormal EKG.   3. H/O Mild obesity with recent unintentional weight loss.  4. Palpitations.  a. June 2017 Holter-occasional premature ventricular contractions.  b. July 2018 Holter-average heart rate 80 bpm, rare PVCs, rare PACs, nocturnal bradycardia.    Previous cardiac studies and procedures:  October 2019 coronary calcium score: 0    ASSESSMENT:   Diagnosis Plan   1. Mixed hyperlipidemia  Lipid Panel   2. PVC (premature ventricular contraction)  Holter Monitor - 24 Hour   3. Palpitations  Holter Monitor - 24 Hour   4. Precordial pain  Adult Transthoracic Echo Complete W/ Cont if Necessary Per Protocol       PLAN:  Symptomatic premature ventricular contractions: Limited burden of arrhythmia on Holter.  Continue beta-blockade.  Recent ongoing flare, repeat echo and Holter monitor pending.  Trial of over-the-counter famotidine to see if that helps some of his meal associated palpitations.  If this is ineffective will increase Toprol-XL back to 50 mg p.o. daily.  Encouraged regular exercise and avoidance of sleep deprivation.    Abnormal EKG: Suspect EKG changes are normal variant in this young -American gentleman.  Would be reasonable to screen for underlying occult cardiomyopathy every 5 years with transthoracic echo.  Normal stress echo 2015, recommend repeat transthoracic echo  "pending.    Hyperlipidemia: Increasing .  Normal CRP, no significant elevation in LP(a), calcium score 0.  Encouraged predominant plant-based diet and regular exercise.  Repeat lipid profile pending.    Subjective  44-year-old gentleman with a history of recurrent palpitations and atypical chest pain.   Blood pressures are running less than 130/80 mmHg.  Reviewed most recent lipid profile.  Recent increase in palpitations which he describes as if his heart were stopping following meals and often at nighttime when he is try to go to sleep.  Compliant with beta-blockade.  Eating less fried foods.  Not exercising on a regular basis.    PHYSICAL EXAMINATION:  Vitals:    07/14/20 1007   BP: 134/82   BP Location: Right arm   Patient Position: Sitting   Pulse: 75   SpO2: 97%   Weight: 97.1 kg (214 lb)   Height: 177.8 cm (70\")     General Appearance:    Alert, cooperative, no distress, appears stated age   Head:    Normocephalic, without obvious abnormality, atraumatic   Eyes:    conjunctiva/corneas clear   Nose:   Nares normal, septum midline, mucosa normal, no drainage   Throat:   Lips, teeth and gums normal   Neck:   Supple, symmetrical, trachea midline, no carotid    bruit or JVD   Lungs:     Clear to auscultation bilaterally, respirations unlabored   Chest Wall:    No tenderness or deformity    Heart:    Regular rate and rhythm, S1 and S2 normal, no murmur, rub   or gallop, normal carotid impulse bilaterally without bruit.   Abdomen:     Soft, non-tender   Extremities:   Extremities normal, atraumatic, no cyanosis or edema   Pulses:   2+ and symmetric all extremities   Skin:   Skin color, texture, turgor normal, no rashes or lesions       Diagnostic Data:  Procedures  No results found for: CHLPL, TRIG, HDL, LDLDIRECT  Lab Results   Component Value Date    GLUCOSE 104 (H) 01/09/2020    BUN 11 01/09/2020    CREATININE 1.02 01/09/2020     01/09/2020    K 3.8 01/09/2020    CL 94 (L) 01/09/2020    CO2 27.0 " 01/09/2020     No results found for: HGBA1C  Lab Results   Component Value Date    WBC 6.04 01/09/2020    HGB 16.9 01/09/2020    HCT 50.4 01/09/2020     01/09/2020       Allergies  No Known Allergies    Current Medications    Current Outpatient Medications:   •  metoprolol succinate XL (TOPROL-XL) 25 MG 24 hr tablet, TAKE 1 TABLET BY MOUTH EVERY DAY, Disp: 90 tablet, Rfl: 0          ROS  Review of Systems   Cardiovascular: Positive for palpitations. Negative for chest pain, dyspnea on exertion and irregular heartbeat.   Respiratory: Negative for cough, shortness of breath and wheezing.        SOCIAL HX  Social History     Socioeconomic History   • Marital status: Single     Spouse name: Not on file   • Number of children: Not on file   • Years of education: Not on file   • Highest education level: Not on file   Tobacco Use   • Smoking status: Never Smoker   • Smokeless tobacco: Never Used   Substance and Sexual Activity   • Alcohol use: Yes     Alcohol/week: 1.0 standard drinks     Types: 1 Shots of liquor per week     Comment: occas   • Drug use: No   • Sexual activity: Defer       FAMILY HX  Family History   Problem Relation Age of Onset   • Breast cancer Mother    • Gallbladder disease Mother    • Depression Mother    • Gallbladder disease Father    • Depression Father    • Diabetes Father    • Asthma Sister              Jose Flores III, MD, FACC

## 2020-07-15 ENCOUNTER — TELEPHONE (OUTPATIENT)
Dept: CARDIOLOGY | Facility: CLINIC | Age: 45
End: 2020-07-15

## 2020-07-15 NOTE — TELEPHONE ENCOUNTER
----- Message from Jose Flores III, MD sent at 7/15/2020  8:47 AM EDT -----  Cholesterol numbers have improved.  Continue to focus on a heart healthy diet.

## 2020-08-04 ENCOUNTER — HOSPITAL ENCOUNTER (OUTPATIENT)
Dept: CARDIOLOGY | Facility: HOSPITAL | Age: 45
End: 2020-08-04

## 2020-09-02 ENCOUNTER — HOSPITAL ENCOUNTER (OUTPATIENT)
Dept: CARDIOLOGY | Facility: HOSPITAL | Age: 45
Discharge: HOME | End: 2020-09-02
Admitting: INTERNAL MEDICINE

## 2020-09-02 VITALS — WEIGHT: 214 LBS | BODY MASS INDEX: 30.64 KG/M2 | HEIGHT: 70 IN

## 2020-09-02 DIAGNOSIS — R07.2 PRECORDIAL PAIN: ICD-10-CM

## 2020-09-02 LAB
ASCENDING AORTA: 3.1 CM
BH CV ECHO LEFT VENTRICLE BASAL CAVITARY GRADIENT: 4 MMHG
BH CV ECHO MEAS - AO MAX PG (FULL): 1.2 MMHG
BH CV ECHO MEAS - AO MAX PG: 6 MMHG
BH CV ECHO MEAS - AO MEAN PG (FULL): 1 MMHG
BH CV ECHO MEAS - AO MEAN PG: 4 MMHG
BH CV ECHO MEAS - AO ROOT AREA (BSA CORRECTED): 1.6
BH CV ECHO MEAS - AO ROOT AREA: 9.6 CM^2
BH CV ECHO MEAS - AO ROOT DIAM: 3.5 CM
BH CV ECHO MEAS - AO V2 MAX: 125 CM/SEC
BH CV ECHO MEAS - AO V2 MEAN: 89.3 CM/SEC
BH CV ECHO MEAS - AO V2 VTI: 23.4 CM
BH CV ECHO MEAS - ASC AORTA: 3.1 CM
BH CV ECHO MEAS - AVA(I,A): 3 CM^2
BH CV ECHO MEAS - AVA(I,D): 3 CM^2
BH CV ECHO MEAS - AVA(V,A): 2.8 CM^2
BH CV ECHO MEAS - AVA(V,D): 2.8 CM^2
BH CV ECHO MEAS - BSA(HAYCOCK): 2.2 M^2
BH CV ECHO MEAS - BSA: 2.1 M^2
BH CV ECHO MEAS - BZI_BMI: 30.7 KILOGRAMS/M^2
BH CV ECHO MEAS - BZI_METRIC_HEIGHT: 177.8 CM
BH CV ECHO MEAS - BZI_METRIC_WEIGHT: 97.1 KG
BH CV ECHO MEAS - EDV(CUBED): 45.4 ML
BH CV ECHO MEAS - EDV(MOD-SP2): 60 ML
BH CV ECHO MEAS - EDV(MOD-SP4): 68 ML
BH CV ECHO MEAS - EDV(TEICH): 53.3 ML
BH CV ECHO MEAS - EF(CUBED): 81.8 %
BH CV ECHO MEAS - EF(MOD-BP): 75 %
BH CV ECHO MEAS - EF(MOD-SP2): 73.3 %
BH CV ECHO MEAS - EF(MOD-SP4): 75 %
BH CV ECHO MEAS - EF(TEICH): 75.4 %
BH CV ECHO MEAS - ESV(CUBED): 8.3 ML
BH CV ECHO MEAS - ESV(MOD-SP2): 16 ML
BH CV ECHO MEAS - ESV(MOD-SP4): 17 ML
BH CV ECHO MEAS - ESV(TEICH): 13.1 ML
BH CV ECHO MEAS - FS: 43.3 %
BH CV ECHO MEAS - IVS/LVPW: 1
BH CV ECHO MEAS - IVSD: 1.3 CM
BH CV ECHO MEAS - LA DIMENSION: 3.2 CM
BH CV ECHO MEAS - LA/AO: 0.91
BH CV ECHO MEAS - LAD MAJOR: 4.4 CM
BH CV ECHO MEAS - LAT PEAK E' VEL: 12.8 CM/SEC
BH CV ECHO MEAS - LATERAL E/E' RATIO: 6
BH CV ECHO MEAS - LV DIASTOLIC VOL/BSA (35-75): 31.7 ML/M^2
BH CV ECHO MEAS - LV IVRT: 0.08 SEC
BH CV ECHO MEAS - LV MASS(C)D: 147 GRAMS
BH CV ECHO MEAS - LV MASS(C)DI: 68.4 GRAMS/M^2
BH CV ECHO MEAS - LV MAX PG: 4.8 MMHG
BH CV ECHO MEAS - LV MEAN PG: 3 MMHG
BH CV ECHO MEAS - LV SYSTOLIC VOL/BSA (12-30): 7.9 ML/M^2
BH CV ECHO MEAS - LV V1 MAX: 110 CM/SEC
BH CV ECHO MEAS - LV V1 MEAN: 81.8 CM/SEC
BH CV ECHO MEAS - LV V1 VTI: 22.7 CM
BH CV ECHO MEAS - LVIDD: 3.6 CM
BH CV ECHO MEAS - LVIDS: 2 CM
BH CV ECHO MEAS - LVLD AP2: 8.8 CM
BH CV ECHO MEAS - LVLD AP4: 7.9 CM
BH CV ECHO MEAS - LVLS AP2: 6.4 CM
BH CV ECHO MEAS - LVLS AP4: 5.8 CM
BH CV ECHO MEAS - LVOT AREA (M): 3.1 CM^2
BH CV ECHO MEAS - LVOT AREA: 3.1 CM^2
BH CV ECHO MEAS - LVOT DIAM: 2 CM
BH CV ECHO MEAS - LVPWD: 1.2 CM
BH CV ECHO MEAS - MED PEAK E' VEL: 7.4 CM/SEC
BH CV ECHO MEAS - MEDIAL E/E' RATIO: 10.4
BH CV ECHO MEAS - MV A MAX VEL: 54.8 CM/SEC
BH CV ECHO MEAS - MV DEC TIME: 0.31 SEC
BH CV ECHO MEAS - MV E MAX VEL: 76.4 CM/SEC
BH CV ECHO MEAS - MV E/A: 1.4
BH CV ECHO MEAS - MV MAX PG: 4.8 MMHG
BH CV ECHO MEAS - MV MEAN PG: 1 MMHG
BH CV ECHO MEAS - MV V2 MAX: 109 CM/SEC
BH CV ECHO MEAS - MV V2 MEAN: 53 CM/SEC
BH CV ECHO MEAS - MV V2 VTI: 24.1 CM
BH CV ECHO MEAS - MVA(VTI): 3 CM^2
BH CV ECHO MEAS - PA ACC SLOPE: 645 CM/SEC^2
BH CV ECHO MEAS - PA ACC TIME: 0.12 SEC
BH CV ECHO MEAS - PA MAX PG: 3.4 MMHG
BH CV ECHO MEAS - PA PR(ACCEL): 23.7 MMHG
BH CV ECHO MEAS - PA V2 MAX: 92.3 CM/SEC
BH CV ECHO MEAS - RAP SYSTOLE: 3 MMHG
BH CV ECHO MEAS - SI(AO): 104.8 ML/M^2
BH CV ECHO MEAS - SI(CUBED): 17.3 ML/M^2
BH CV ECHO MEAS - SI(LVOT): 33.2 ML/M^2
BH CV ECHO MEAS - SI(MOD-SP2): 20.5 ML/M^2
BH CV ECHO MEAS - SI(MOD-SP4): 23.7 ML/M^2
BH CV ECHO MEAS - SI(TEICH): 18.7 ML/M^2
BH CV ECHO MEAS - SV(AO): 225.1 ML
BH CV ECHO MEAS - SV(CUBED): 37.1 ML
BH CV ECHO MEAS - SV(LVOT): 71.3 ML
BH CV ECHO MEAS - SV(MOD-SP2): 44 ML
BH CV ECHO MEAS - SV(MOD-SP4): 51 ML
BH CV ECHO MEAS - SV(TEICH): 40.2 ML
BH CV ECHO MEASUREMENTS AVERAGE E/E' RATIO: 7.56
BH CV VAS BP RIGHT ARM: NORMAL MMHG
BH CV XLRA - RV BASE: 3.2 CM
BH CV XLRA - RV LENGTH: 6.5 CM
BH CV XLRA - RV MID: 2.8 CM
BH CV XLRA - TDI S': 12.2 CM/SEC
IVRT: 77 MSEC
LEFT ATRIUM VOLUME INDEX: 14.9 ML/M2

## 2020-09-02 PROCEDURE — 93306 TTE W/DOPPLER COMPLETE: CPT | Performed by: INTERNAL MEDICINE

## 2020-09-02 PROCEDURE — 93306 TTE W/DOPPLER COMPLETE: CPT

## 2020-09-03 ENCOUNTER — TELEPHONE (OUTPATIENT)
Dept: CARDIOLOGY | Facility: CLINIC | Age: 45
End: 2020-09-03

## 2020-09-03 NOTE — TELEPHONE ENCOUNTER
----- Message from Jose Flores III, MD sent at 9/3/2020 11:16 AM EDT -----  Other than mild thickening of the heart muscle everything else looks normal on your echo.  We will discuss further follow-up.

## 2020-10-01 RX ORDER — METOPROLOL SUCCINATE 25 MG/1
TABLET, EXTENDED RELEASE ORAL
Qty: 90 TABLET | Refills: 0 | Status: SHIPPED | OUTPATIENT
Start: 2020-10-01 | End: 2020-10-27 | Stop reason: SDUPTHER

## 2020-10-27 ENCOUNTER — OFFICE VISIT (OUTPATIENT)
Dept: CARDIOLOGY | Facility: CLINIC | Age: 45
End: 2020-10-27

## 2020-10-27 VITALS
BODY MASS INDEX: 30.64 KG/M2 | OXYGEN SATURATION: 97 % | SYSTOLIC BLOOD PRESSURE: 120 MMHG | DIASTOLIC BLOOD PRESSURE: 70 MMHG | HEART RATE: 83 BPM | WEIGHT: 214 LBS | TEMPERATURE: 97.3 F | HEIGHT: 70 IN

## 2020-10-27 DIAGNOSIS — E78.2 MIXED HYPERLIPIDEMIA: ICD-10-CM

## 2020-10-27 DIAGNOSIS — I49.3 PVC (PREMATURE VENTRICULAR CONTRACTION): ICD-10-CM

## 2020-10-27 DIAGNOSIS — R00.2 PALPITATIONS: Primary | ICD-10-CM

## 2020-10-27 PROCEDURE — 99214 OFFICE O/P EST MOD 30 MIN: CPT | Performed by: INTERNAL MEDICINE

## 2020-10-27 RX ORDER — METOPROLOL SUCCINATE 25 MG/1
25 TABLET, EXTENDED RELEASE ORAL DAILY
Qty: 90 TABLET | Refills: 3 | Status: SHIPPED | OUTPATIENT
Start: 2020-10-27 | End: 2021-06-08 | Stop reason: SDUPTHER

## 2020-10-27 NOTE — PROGRESS NOTES
[facility] at [facility]  Office visit  Bereket Palmer Jr.  1975  [phone]    VISIT DATE:  10/27/2020      PCP: Jose Morse MD  [address] [address]    CC:  Chief Complaint   Patient presents with   • mixed hyperlipidemia       PROBLEM LIST:  1. Chest pains:   a. First episode 2003, with cardiac catheterization showing no evidence of coronary disease, normal LV function.   b. Recurrent episode 2010, with Medical management only.   c. Recurrent episodes, requiring ER visitation, 11/12/2015: Ruling out for an MI, no change in EKG.   d. December 2015: Normal stress echocardiogram.  2. Abnormal EKG.   3. H/O Mild obesity with recent unintentional weight loss.  4. Palpitations.  a. June 2017 Holter-occasional premature ventricular contractions.  b. July 2018 Holter-average heart rate 80 bpm, rare PVCs, rare PACs, nocturnal bradycardia.    Previous cardiac studies and procedures:  October 2019 coronary calcium score: 0    July 2020 24 Holter: Normal    September 2020 echo  · Left ventricular systolic function is hyperdynamic (EF > 70).  · Left ventricular wall thickness is consistent with mild concentric hypertrophy.    ASSESSMENT:   Diagnosis Plan   1. Palpitations     2. PVC (premature ventricular contraction)     3. Mixed hyperlipidemia         PLAN:  Symptomatic premature ventricular contractions: Limited burden of arrhythmia on Holter.  Continue beta-blockade.  Recent ongoing flare, repeat echo and Holter monitor pending.  Trial of over-the-counter famotidine to see if that helps some of his meal associated palpitations.  If this is ineffective will increase Toprol-XL back to 50 mg p.o. daily.  Encouraged regular exercise and avoidance of sleep deprivation.    Abnormal EKG: Suspect EKG changes are normal variant in this young -American gentleman, however he has developed some mild concentric left ventricular hypertrophy in the absence of hypertension.   "Currently no suspicion for an infiltrative cardiomyopathy.  Cannot exclude underlying hypertrophic cardiomyopathy, however he has no high risk clinical features.  Will trend with annual EKG and echocardiographic evaluation every 2 to 3 years.    Hyperlipidemia: LDL decreased from 1 66-1 36 with dietary modification.  Normal CRP, no significant elevation in LP(a), calcium score 0.  Encouraged predominant plant-based diet and regular exercise.  Annual lipid profile.    Subjective  45-year-old gentleman with a history of recurrent palpitations and atypical chest pain.   Blood pressures are running less than 130/80 mmHg.  Reviewed most recent lipid profile.  He has begun to exercise on a more consistent basis.  Palpitations are well controlled on current dose of Toprol-XL.    PHYSICAL EXAMINATION:  Vitals:    10/27/20 1028   BP: 120/70   BP Location: Right arm   Patient Position: Sitting   Pulse: 83   Temp: 97.3 °F (36.3 °C)   SpO2: 97%   Weight: 97.1 kg (214 lb)   Height: 177.8 cm (70\")     General Appearance:    Alert, cooperative, no distress, appears stated age   Head:    Normocephalic, without obvious abnormality, atraumatic   Eyes:    conjunctiva/corneas clear   Nose:   Nares normal, septum midline, mucosa normal, no drainage   Throat:   Lips, teeth and gums normal   Neck:   Supple, symmetrical, trachea midline, no carotid    bruit or JVD   Lungs:     Clear to auscultation bilaterally, respirations unlabored   Chest Wall:    No tenderness or deformity    Heart:    Regular rate and rhythm, S1 and S2 normal, no murmur, rub   or gallop, normal carotid impulse bilaterally without bruit.   Abdomen:     Soft, non-tender   Extremities:   Extremities normal, atraumatic, no cyanosis or edema   Pulses:   2+ and symmetric all extremities   Skin:   Skin color, texture, turgor normal, no rashes or lesions       Diagnostic Data:  Procedures  Lab Results   Component Value Date    TRIG 78 07/14/2020    HDL 51 07/14/2020     Lab " Results   Component Value Date    GLUCOSE 104 (H) 01/09/2020    BUN 11 01/09/2020    CREATININE 1.02 01/09/2020     01/09/2020    K 3.8 01/09/2020    CL 94 (L) 01/09/2020    CO2 27.0 01/09/2020     No results found for: HGBA1C  Lab Results   Component Value Date    WBC 6.04 01/09/2020    HGB 16.9 01/09/2020    HCT 50.4 01/09/2020     01/09/2020       Allergies  No Known Allergies    Current Medications    Current Outpatient Medications:   •  metoprolol succinate XL (TOPROL-XL) 25 MG 24 hr tablet, Take 1 tablet by mouth Daily., Disp: 90 tablet, Rfl: 3          ROS  Review of Systems   Cardiovascular: Positive for palpitations. Negative for chest pain, dyspnea on exertion and irregular heartbeat.   Respiratory: Negative for cough, shortness of breath and wheezing.        SOCIAL HX  Social History     Socioeconomic History   • Marital status: Single     Spouse name: Not on file   • Number of children: Not on file   • Years of education: Not on file   • Highest education level: Not on file   Tobacco Use   • Smoking status: Never Smoker   • Smokeless tobacco: Never Used   Substance and Sexual Activity   • Alcohol use: Yes     Alcohol/week: 1.0 standard drinks     Types: 1 Shots of liquor per week     Comment: occas   • Drug use: No   • Sexual activity: Defer       FAMILY HX  Family History   Problem Relation Age of Onset   • Breast cancer Mother    • Gallbladder disease Mother    • Depression Mother    • Gallbladder disease Father    • Depression Father    • Diabetes Father    • Asthma Sister              Jose Flores III, MD, [facility]

## 2021-06-08 ENCOUNTER — OFFICE VISIT (OUTPATIENT)
Dept: CARDIOLOGY | Facility: CLINIC | Age: 46
End: 2021-06-08

## 2021-06-08 VITALS
DIASTOLIC BLOOD PRESSURE: 74 MMHG | WEIGHT: 220.4 LBS | BODY MASS INDEX: 31.55 KG/M2 | HEART RATE: 78 BPM | HEIGHT: 70 IN | OXYGEN SATURATION: 97 % | SYSTOLIC BLOOD PRESSURE: 118 MMHG

## 2021-06-08 DIAGNOSIS — R94.31 ABNORMAL EKG: Primary | ICD-10-CM

## 2021-06-08 DIAGNOSIS — I49.3 PVC (PREMATURE VENTRICULAR CONTRACTION): ICD-10-CM

## 2021-06-08 DIAGNOSIS — E78.2 MIXED HYPERLIPIDEMIA: ICD-10-CM

## 2021-06-08 PROCEDURE — 99214 OFFICE O/P EST MOD 30 MIN: CPT | Performed by: INTERNAL MEDICINE

## 2021-06-08 PROCEDURE — 93000 ELECTROCARDIOGRAM COMPLETE: CPT | Performed by: INTERNAL MEDICINE

## 2021-06-08 RX ORDER — METOPROLOL SUCCINATE 25 MG/1
25 TABLET, EXTENDED RELEASE ORAL DAILY
Qty: 90 TABLET | Refills: 3 | Status: SHIPPED | OUTPATIENT
Start: 2021-06-08 | End: 2022-06-22

## 2021-06-08 NOTE — PROGRESS NOTES
[facility] at [facility]  Office visit  Bereket Palmer Jr.  1975  [phone]    VISIT DATE:  6/8/2021      PCP: Jose Morse MD  [address] [address]    CC:  Chief Complaint   Patient presents with   • Hyperlipidemia   • Palpitations       PROBLEM LIST:  1. Chest pains:   a. First episode 2003, with cardiac catheterization showing no evidence of coronary disease, normal LV function.   b. Recurrent episode 2010, with Medical management only.   c. Recurrent episodes, requiring ER visitation, 11/12/2015: Ruling out for an MI, no change in EKG.   d. December 2015: Normal stress echocardiogram.  2. Abnormal EKG.   3. H/O Mild obesity with recent unintentional weight loss.  4. Palpitations.  a. June 2017 Holter-occasional premature ventricular contractions.  b. July 2018 Holter-average heart rate 80 bpm, rare PVCs, rare PACs, nocturnal bradycardia.    Previous cardiac studies and procedures:  October 2019 coronary calcium score: 0    July 2020 24 Holter: Normal    September 2020 echo  · Left ventricular systolic function is hyperdynamic (EF > 70).  · Left ventricular wall thickness is consistent with mild concentric hypertrophy.    ASSESSMENT:   Diagnosis Plan   1. Abnormal EKG     2. Mixed hyperlipidemia  Lipid Panel   3. PVC (premature ventricular contraction)         PLAN:  Symptomatic premature ventricular contractions: Limited burden of arrhythmia on most recent Holter.  Continue beta-blockade.     Abnormal EKG: Suspect EKG changes are normal variant in this young -American gentleman, however he has developed some mild concentric left ventricular hypertrophy in the absence of hypertension.  Currently no suspicion for an infiltrative cardiomyopathy.  Cannot exclude underlying hypertrophic cardiomyopathy, however he has no high risk clinical features.  Will trend with annual EKG and echocardiographic evaluation every 2 to 3 years.  Considering cardiac MRI for  "next imaging evaluation instead of echo for more definitive evaluation.    Hyperlipidemia: Goal LDL less than 130.  Normal CRP, no significant elevation in LP(a), calcium score 0.  Encouraged predominant plant-based diet and regular exercise.  Annual lipid profile, pending.    Subjective  45-year-old gentleman with a history of recurrent palpitations and atypical chest pain.   Blood pressures are running less than 130/80 mmHg.  Has not been exercising as consistently recently and gained approximately 6 pounds since his last visit.  Palpitations are well controlled on current dose of Toprol-XL, he does notice that they will flareup if he misses a few doses.    PHYSICAL EXAMINATION:  Vitals:    06/08/21 0845   BP: 118/74   BP Location: Right arm   Patient Position: Sitting   Pulse: 78   SpO2: 97%   Weight: 100 kg (220 lb 6.4 oz)   Height: 177.8 cm (70\")     General Appearance:    Alert, cooperative, no distress, appears stated age   Head:    Normocephalic, without obvious abnormality, atraumatic   Eyes:    conjunctiva/corneas clear   Nose:   Nares normal, septum midline, mucosa normal, no drainage   Throat:   Lips, teeth and gums normal   Neck:   Supple, symmetrical, trachea midline, no carotid    bruit or JVD   Lungs:     Clear to auscultation bilaterally, respirations unlabored   Chest Wall:    No tenderness or deformity    Heart:    Regular rate and rhythm, S1 and S2 normal, no murmur, rub   or gallop, normal carotid impulse bilaterally without bruit.   Abdomen:     Soft, non-tender   Extremities:   Extremities normal, atraumatic, no cyanosis or edema   Pulses:   2+ and symmetric all extremities   Skin:   Skin color, texture, turgor normal, no rashes or lesions       Diagnostic Data:    ECG 12 Lead    Date/Time: 6/8/2021 9:05 AM  Performed by: Jose Flores III, MD  Authorized by: Jose Flores III, MD   Comparison: compared with previous ECG from 1/9/2020  Similar to previous ECG  Rhythm: sinus rhythm  Other " findings: left ventricular hypertrophy with strain    Clinical impression: abnormal EKG          Lab Results   Component Value Date    TRIG 78 07/14/2020    HDL 51 07/14/2020     Lab Results   Component Value Date    GLUCOSE 104 (H) 01/09/2020    BUN 11 01/09/2020    CREATININE 1.02 01/09/2020     01/09/2020    K 3.8 01/09/2020    CL 94 (L) 01/09/2020    CO2 27.0 01/09/2020     No results found for: HGBA1C  Lab Results   Component Value Date    WBC 6.04 01/09/2020    HGB 16.9 01/09/2020    HCT 50.4 01/09/2020     01/09/2020       Allergies  No Known Allergies    Current Medications    Current Outpatient Medications:   •  metoprolol succinate XL (TOPROL-XL) 25 MG 24 hr tablet, Take 1 tablet by mouth Daily., Disp: 90 tablet, Rfl: 3          ROS  Review of Systems   Cardiovascular: Positive for palpitations. Negative for chest pain, dyspnea on exertion and irregular heartbeat.   Respiratory: Negative for cough, shortness of breath and wheezing.        SOCIAL HX  Social History     Socioeconomic History   • Marital status: Single     Spouse name: Not on file   • Number of children: Not on file   • Years of education: Not on file   • Highest education level: Not on file   Tobacco Use   • Smoking status: Never Smoker   • Smokeless tobacco: Never Used   Vaping Use   • Vaping Use: Never used   Substance and Sexual Activity   • Alcohol use: Yes     Alcohol/week: 1.0 standard drinks     Types: 1 Shots of liquor per week     Comment: occas   • Drug use: No   • Sexual activity: Defer       FAMILY HX  Family History   Problem Relation Age of Onset   • Breast cancer Mother    • Gallbladder disease Mother    • Depression Mother    • Gallbladder disease Father    • Depression Father    • Diabetes Father    • Asthma Sister              Jose Flores III, MD, [facility]

## 2022-01-11 ENCOUNTER — OFFICE VISIT (OUTPATIENT)
Dept: CARDIOLOGY | Facility: CLINIC | Age: 47
End: 2022-01-11

## 2022-01-11 VITALS
DIASTOLIC BLOOD PRESSURE: 72 MMHG | HEART RATE: 76 BPM | WEIGHT: 226 LBS | SYSTOLIC BLOOD PRESSURE: 130 MMHG | OXYGEN SATURATION: 97 % | BODY MASS INDEX: 32.35 KG/M2 | HEIGHT: 70 IN

## 2022-01-11 DIAGNOSIS — I49.3 PVC (PREMATURE VENTRICULAR CONTRACTION): ICD-10-CM

## 2022-01-11 DIAGNOSIS — R00.2 PALPITATIONS: ICD-10-CM

## 2022-01-11 DIAGNOSIS — E78.2 MIXED HYPERLIPIDEMIA: Primary | ICD-10-CM

## 2022-01-11 PROCEDURE — 99214 OFFICE O/P EST MOD 30 MIN: CPT | Performed by: INTERNAL MEDICINE

## 2022-01-11 RX ORDER — OMEPRAZOLE 20 MG/1
1 CAPSULE, DELAYED RELEASE ORAL 2 TIMES DAILY PRN
COMMUNITY
Start: 2022-01-07 | End: 2022-07-12

## 2022-01-11 NOTE — PROGRESS NOTES
[facility]  Office visit  Bereket Palmer Jr.  1975  [phone]    VISIT DATE:  1/11/2022      PCP: Jose Morse MD  [address] [address]    CC:  Chief Complaint   Patient presents with   • Palpitations   • PVC (premature ventricular contraction)       PROBLEM LIST:  1. Chest pains:   a. First episode 2003, with cardiac catheterization showing no evidence of coronary disease, normal LV function.   b. Recurrent episode 2010, with Medical management only.   c. Recurrent episodes, requiring ER visitation, 11/12/2015: Ruling out for an MI, no change in EKG.   d. December 2015: Normal stress echocardiogram.  2. Abnormal EKG.   3. H/O Mild obesity with recent unintentional weight loss.  4. Palpitations.  a. June 2017 Holter-occasional premature ventricular contractions.  b. July 2018 Holter-average heart rate 80 bpm, rare PVCs, rare PACs, nocturnal bradycardia.    Previous cardiac studies and procedures:  October 2019 coronary calcium score: 0    July 2020 24 Holter: Normal    September 2020 echo  · Left ventricular systolic function is hyperdynamic (EF > 70).  · Left ventricular wall thickness is consistent with mild concentric hypertrophy.    ASSESSMENT:   Diagnosis Plan   1. Mixed hyperlipidemia     2. Palpitations     3. PVC (premature ventricular contraction)         PLAN:  Symptomatic premature ventricular contractions: Limited burden of arrhythmia on most recent Holter.  Continue beta-blockade.     Abnormal EKG: Suspect EKG changes are normal variant in this young -American gentleman, however he has developed some mild concentric left ventricular hypertrophy in the absence of hypertension.  Currently no suspicion for an infiltrative cardiomyopathy.  Cannot exclude underlying hypertrophic cardiomyopathy, however he has no high risk clinical features.  Will trend with annual EKG.  cardiac MRI for next imaging evaluation and 2023 instead of echo  "for more definitive evaluation.    Hyperlipidemia: Goal LDL less than 130.  Normal CRP, no significant elevation in LP(a), calcium score 0.  Encouraged predominant plant-based diet and regular exercise.  Annual lipid profile, pending.    Subjective  46-year-old gentleman with a history of recurrent palpitations and atypical chest pain.   Blood pressures are running less than 130/80 mmHg.  No recent exercise.  Palpitations are well controlled on current dose of Toprol-XL.  Recent diagnosis of GERD associated cough.  EGD pending.  Symptoms have improved with initiation of twice daily PPI.    PHYSICAL EXAMINATION:  Vitals:    01/11/22 0925   BP: 130/72   BP Location: Right arm   Patient Position: Sitting   Pulse: 76   SpO2: 97%   Weight: 103 kg (226 lb)   Height: 177.8 cm (70\")     General Appearance:    Alert, cooperative, no distress, appears stated age   Head:    Normocephalic, without obvious abnormality, atraumatic   Eyes:    conjunctiva/corneas clear   Nose:   Nares normal, septum midline, mucosa normal, no drainage   Throat:   Lips, teeth and gums normal   Neck:   Supple, symmetrical, trachea midline, no carotid    bruit or JVD   Lungs:     Clear to auscultation bilaterally, respirations unlabored   Chest Wall:    No tenderness or deformity    Heart:    Regular rate and rhythm, S1 and S2 normal, no murmur, rub   or gallop, normal carotid impulse bilaterally without bruit.   Abdomen:     Soft, non-tender   Extremities:   Extremities normal, atraumatic, no cyanosis or edema   Pulses:   2+ and symmetric all extremities   Skin:   Skin color, texture, turgor normal, no rashes or lesions       Diagnostic Data:  Procedures  Lab Results   Component Value Date    TRIG 78 07/14/2020    HDL 51 07/14/2020     Lab Results   Component Value Date    GLUCOSE 104 (H) 01/09/2020    BUN 11 01/09/2020    CREATININE 1.02 01/09/2020     01/09/2020    K 3.8 01/09/2020    CL 94 (L) 01/09/2020    CO2 27.0 01/09/2020     No results " found for: HGBA1C  Lab Results   Component Value Date    WBC 6.04 01/09/2020    HGB 16.9 01/09/2020    HCT 50.4 01/09/2020     01/09/2020       Allergies  No Known Allergies    Current Medications    Current Outpatient Medications:   •  metoprolol succinate XL (TOPROL-XL) 25 MG 24 hr tablet, Take 1 tablet by mouth Daily., Disp: 90 tablet, Rfl: 3  •  omeprazole (priLOSEC) 20 MG capsule, Take 1 capsule by mouth 2 (Two) Times a Day As Needed., Disp: , Rfl:           ROS  Review of Systems   Cardiovascular: Positive for palpitations. Negative for chest pain, dyspnea on exertion and irregular heartbeat.   Respiratory: Negative for cough, shortness of breath and wheezing.        SOCIAL HX  Social History     Socioeconomic History   • Marital status: Single   Tobacco Use   • Smoking status: Never Smoker   • Smokeless tobacco: Never Used   Vaping Use   • Vaping Use: Never used   Substance and Sexual Activity   • Alcohol use: Yes     Alcohol/week: 1.0 standard drink     Types: 1 Shots of liquor per week     Comment: occas   • Drug use: No   • Sexual activity: Defer       FAMILY HX  Family History   Problem Relation Age of Onset   • Breast cancer Mother    • Gallbladder disease Mother    • Depression Mother    • Gallbladder disease Father    • Depression Father    • Diabetes Father    • Asthma Sister              Jose Flores III, MD, FACC

## 2022-06-22 RX ORDER — METOPROLOL SUCCINATE 25 MG/1
TABLET, EXTENDED RELEASE ORAL
Qty: 90 TABLET | Refills: 0 | Status: SHIPPED | OUTPATIENT
Start: 2022-06-22 | End: 2022-07-12 | Stop reason: SDUPTHER

## 2022-06-24 ENCOUNTER — TRANSCRIBE ORDERS (OUTPATIENT)
Dept: ADMINISTRATIVE | Facility: HOSPITAL | Age: 47
End: 2022-06-24

## 2022-06-24 DIAGNOSIS — R22.2 PLEURAL NODULE: Primary | ICD-10-CM

## 2022-06-25 ENCOUNTER — HOSPITAL ENCOUNTER (OUTPATIENT)
Dept: ULTRASOUND IMAGING | Facility: HOSPITAL | Age: 47
Discharge: HOME | End: 2022-06-25
Admitting: INTERNAL MEDICINE

## 2022-06-25 DIAGNOSIS — R22.2 PLEURAL NODULE: ICD-10-CM

## 2022-06-25 PROCEDURE — 76604 US EXAM CHEST: CPT

## 2022-06-27 ENCOUNTER — TRANSCRIBE ORDERS (OUTPATIENT)
Dept: ADMINISTRATIVE | Facility: HOSPITAL | Age: 47
End: 2022-06-27

## 2022-06-27 ENCOUNTER — HOSPITAL ENCOUNTER (OUTPATIENT)
Dept: GENERAL RADIOLOGY | Facility: HOSPITAL | Age: 47
Discharge: HOME | End: 2022-06-27
Admitting: INTERNAL MEDICINE

## 2022-06-27 ENCOUNTER — APPOINTMENT (OUTPATIENT)
Dept: CT IMAGING | Facility: HOSPITAL | Age: 47
End: 2022-06-27

## 2022-06-27 DIAGNOSIS — R22.2 NODULE OF CHEST WALL: ICD-10-CM

## 2022-06-27 DIAGNOSIS — R22.2 NODULE OF CHEST WALL: Primary | ICD-10-CM

## 2022-06-27 PROCEDURE — 71046 X-RAY EXAM CHEST 2 VIEWS: CPT

## 2022-06-27 PROCEDURE — 71100 X-RAY EXAM RIBS UNI 2 VIEWS: CPT

## 2022-06-28 ENCOUNTER — HOSPITAL ENCOUNTER (OUTPATIENT)
Dept: CT IMAGING | Facility: HOSPITAL | Age: 47
Discharge: HOME | End: 2022-06-28
Admitting: INTERNAL MEDICINE

## 2022-06-28 DIAGNOSIS — R22.2 NODULE OF CHEST WALL: ICD-10-CM

## 2022-06-28 PROCEDURE — 71260 CT THORAX DX C+: CPT

## 2022-06-28 PROCEDURE — 25010000002 IOPAMIDOL 61 % SOLUTION: Performed by: INTERNAL MEDICINE

## 2022-06-28 RX ADMIN — IOPAMIDOL 85 ML: 612 INJECTION, SOLUTION INTRAVENOUS at 17:27

## 2022-07-12 ENCOUNTER — OFFICE VISIT (OUTPATIENT)
Dept: CARDIOLOGY | Facility: CLINIC | Age: 47
End: 2022-07-12

## 2022-07-12 VITALS
HEART RATE: 77 BPM | SYSTOLIC BLOOD PRESSURE: 136 MMHG | HEIGHT: 70 IN | WEIGHT: 215 LBS | DIASTOLIC BLOOD PRESSURE: 78 MMHG | BODY MASS INDEX: 30.78 KG/M2 | OXYGEN SATURATION: 97 %

## 2022-07-12 DIAGNOSIS — R94.31 ABNORMAL EKG: Primary | ICD-10-CM

## 2022-07-12 DIAGNOSIS — I49.3 PVC (PREMATURE VENTRICULAR CONTRACTION): ICD-10-CM

## 2022-07-12 DIAGNOSIS — E78.2 MIXED HYPERLIPIDEMIA: ICD-10-CM

## 2022-07-12 PROCEDURE — 99214 OFFICE O/P EST MOD 30 MIN: CPT | Performed by: INTERNAL MEDICINE

## 2022-07-12 PROCEDURE — 93000 ELECTROCARDIOGRAM COMPLETE: CPT | Performed by: INTERNAL MEDICINE

## 2022-07-12 RX ORDER — METOPROLOL SUCCINATE 25 MG/1
25 TABLET, EXTENDED RELEASE ORAL DAILY
Qty: 90 TABLET | Refills: 4 | Status: SHIPPED | OUTPATIENT
Start: 2022-07-12

## 2022-07-12 NOTE — PROGRESS NOTES
[facility]  Office visit  Bereket Palmer Jr.  1975  [phone]    VISIT DATE:  7/12/2022      PCP: Jose Morse MD  [address] [address]    CC:  Chief Complaint   Patient presents with   • Mixed hyperlipidemia       PROBLEM LIST:  1. Chest pains:   a. First episode 2003, with cardiac catheterization showing no evidence of coronary disease, normal LV function.   b. Recurrent episode 2010, with Medical management only.   c. Recurrent episodes, requiring ER visitation, 11/12/2015: Ruling out for an MI, no change in EKG.   d. December 2015: Normal stress echocardiogram.  2. Abnormal EKG.   3. H/O Mild obesity with recent unintentional weight loss.  4. Palpitations.  a. June 2017 Holter-occasional premature ventricular contractions.  b. July 2018 Holter-average heart rate 80 bpm, rare PVCs, rare PACs, nocturnal bradycardia.    Previous cardiac studies and procedures:  October 2019 coronary calcium score: 0    July 2020 24 Holter: Normal    September 2020 echo  · Left ventricular systolic function is hyperdynamic (EF > 70).  · Left ventricular wall thickness is consistent with mild concentric hypertrophy.    ASSESSMENT:   Diagnosis Plan   1. Abnormal EKG     2. PVC (premature ventricular contraction)     3. Mixed hyperlipidemia  Lipid Panel       PLAN:  Symptomatic premature ventricular contractions: Limited burden of arrhythmia on most recent Holter.  Continue beta-blockade.     Abnormal EKG: Suspect EKG changes are normal variant in this young -American gentleman, however he has developed some mild concentric left ventricular hypertrophy in the absence of hypertension.  Currently no suspicion for an infiltrative cardiomyopathy.  Cannot exclude underlying hypertrophic cardiomyopathy, however he has no high risk clinical features.  Will trend with annual EKG.  cardiac MRI for next imaging evaluation in 2023 instead of echo for more definitive  "evaluation.    Hyperlipidemia: Goal LDL less than 130.  Normal CRP, no significant elevation in LP(a), calcium score 0.  Encouraged predominant plant-based diet and regular exercise.  Annual lipid profile, pending.    Subjective  46-year-old gentleman with a history of recurrent palpitations and atypical chest pain.   Blood pressures are running less than 130/80 mmHg.  No regular exercise.  Palpitations are well controlled on current dose of Toprol-XL.      PHYSICAL EXAMINATION:  Vitals:    07/12/22 0930   BP: 136/78   BP Location: Right arm   Patient Position: Sitting   Pulse: 77   SpO2: 97%   Weight: 97.5 kg (215 lb)   Height: 177.8 cm (70\")     General Appearance:    Alert, cooperative, no distress, appears stated age   Head:    Normocephalic, without obvious abnormality, atraumatic   Eyes:    conjunctiva/corneas clear   Nose:   Nares normal, septum midline, mucosa normal, no drainage   Throat:   Lips, teeth and gums normal   Neck:   Supple, symmetrical, trachea midline, no carotid    bruit or JVD   Lungs:     Clear to auscultation bilaterally, respirations unlabored   Chest Wall:    No tenderness or deformity    Heart:    Regular rate and rhythm, S1 and S2 normal, no murmur, rub   or gallop, normal carotid impulse bilaterally without bruit.   Abdomen:     Soft, non-tender   Extremities:   Extremities normal, atraumatic, no cyanosis or edema   Pulses:   2+ and symmetric all extremities   Skin:   Skin color, texture, turgor normal, no rashes or lesions       Diagnostic Data:    ECG 12 Lead    Date/Time: 7/12/2022 9:41 AM  Performed by: Jose Flores III, MD  Authorized by: Jose Flores III, MD   Comparison: compared with previous ECG from 6/8/2021  Similar to previous ECG  Rhythm: sinus rhythm  Other findings: T wave abnormality    Clinical impression: abnormal EKG          Lab Results   Component Value Date    TRIG 78 07/14/2020    HDL 51 07/14/2020     Lab Results   Component Value Date    GLUCOSE 104 (H) " 01/09/2020    BUN 11 01/09/2020    CREATININE 1.02 01/09/2020     01/09/2020    K 3.8 01/09/2020    CL 94 (L) 01/09/2020    CO2 27.0 01/09/2020     No results found for: HGBA1C  Lab Results   Component Value Date    WBC 6.04 01/09/2020    HGB 16.9 01/09/2020    HCT 50.4 01/09/2020     01/09/2020       Allergies  No Known Allergies    Current Medications    Current Outpatient Medications:   •  metoprolol succinate XL (TOPROL-XL) 25 MG 24 hr tablet, Take 1 tablet by mouth Daily., Disp: 90 tablet, Rfl: 4          ROS  Review of Systems   Cardiovascular: Positive for palpitations. Negative for chest pain, dyspnea on exertion and irregular heartbeat.   Respiratory: Negative for cough, shortness of breath and wheezing.        SOCIAL HX  Social History     Socioeconomic History   • Marital status:    Tobacco Use   • Smoking status: Never Smoker   • Smokeless tobacco: Never Used   Vaping Use   • Vaping Use: Never used   Substance and Sexual Activity   • Alcohol use: Yes     Alcohol/week: 1.0 standard drink     Types: 1 Shots of liquor per week     Comment: occas   • Drug use: No   • Sexual activity: Defer       FAMILY HX  Family History   Problem Relation Age of Onset   • Breast cancer Mother    • Gallbladder disease Mother    • Depression Mother    • Gallbladder disease Father    • Depression Father    • Diabetes Father    • Asthma Sister              Jose Flores III, MD, FACC

## 2023-07-25 ENCOUNTER — OFFICE VISIT (OUTPATIENT)
Dept: CARDIOLOGY | Facility: CLINIC | Age: 48
End: 2023-07-25
Payer: COMMERCIAL

## 2023-07-25 VITALS
DIASTOLIC BLOOD PRESSURE: 62 MMHG | WEIGHT: 215 LBS | SYSTOLIC BLOOD PRESSURE: 118 MMHG | OXYGEN SATURATION: 97 % | HEART RATE: 84 BPM | HEIGHT: 70 IN | BODY MASS INDEX: 30.78 KG/M2

## 2023-07-25 DIAGNOSIS — I49.3 PVC (PREMATURE VENTRICULAR CONTRACTION): Primary | ICD-10-CM

## 2023-07-25 DIAGNOSIS — R00.2 PALPITATIONS: ICD-10-CM

## 2023-07-25 DIAGNOSIS — I42.2 HYPERTROPHIC NONOBSTRUCTIVE CARDIOMYOPATHY: ICD-10-CM

## 2023-07-25 PROCEDURE — 99214 OFFICE O/P EST MOD 30 MIN: CPT | Performed by: INTERNAL MEDICINE

## 2023-07-25 RX ORDER — METOPROLOL SUCCINATE 25 MG/1
25 TABLET, EXTENDED RELEASE ORAL DAILY
Qty: 90 TABLET | Refills: 4 | Status: SHIPPED | OUTPATIENT
Start: 2023-07-25

## 2023-07-25 NOTE — PROGRESS NOTES
[facility] at [facility]  Office visit  Bereket Palmer Jr.  1975  [phone]    VISIT DATE:  7/25/2023      PCP: Jose Morse MD  [address] [address]    CC:  Chief Complaint   Patient presents with    Follow-up     Mixed hyperlipidemia       PROBLEM LIST:  Chest pains:   First episode 2003, with cardiac catheterization showing no evidence of coronary disease, normal LV function.   Recurrent episode 2010, with Medical management only.   Recurrent episodes, requiring ER visitation, 11/12/2015: Ruling out for an MI, no change in EKG.   December 2015: Normal stress echocardiogram.  Abnormal EKG.   H/O Mild obesity with recent unintentional weight loss.  Palpitations.  June 2017 Holter-occasional premature ventricular contractions.  July 2018 Holter-average heart rate 80 bpm, rare PVCs, rare PACs, nocturnal bradycardia.    Previous cardiac studies and procedures:  October 2019 coronary calcium score: 0    July 2020 24 Holter: Normal    September 2020 echo  Left ventricular systolic function is hyperdynamic (EF > 70).  Left ventricular wall thickness is consistent with mild concentric hypertrophy.    ASSESSMENT:   Diagnosis Plan   1. PVC (premature ventricular contraction)        2. Palpitations            PLAN:  Symptomatic premature ventricular contractions: Limited burden of arrhythmia on most recent Holter.  Continue beta-blockade.     Abnormal EKG: Suspect EKG changes are normal variant in this young -American gentleman, however he has developed some mild concentric left ventricular hypertrophy in the absence of hypertension.  Currently no suspicion for an infiltrative cardiomyopathy.  Cannot exclude underlying hypertrophic cardiomyopathy, however he has no high risk clinical features.  Will trend with annual EKG.      Hyperlipidemia: Goal LDL less than 130.  Normal CRP, no significant elevation in LP(a), calcium score 0.  Encouraged predominant plant-based  "diet and regular exercise.  Annual lipid profile, pending.    Cardiomyopathy, hypertrophic without obstruction: Currently asymptomatic.  Recommending more definitive evaluation of underlying myocardial structure and function with cardiac MRI to rule out evidence of hypertrophic disease or infiltrative cardiomyopathy.    Subjective  48-year-old gentleman with a history of recurrent palpitations and atypical chest pain.   Blood pressures are running less than 130/80 mmHg.  Palpitations are well controlled on current dose of Toprol-XL.      PHYSICAL EXAMINATION:  Vitals:    07/25/23 0920   BP: 118/62   BP Location: Right arm   Patient Position: Sitting   Pulse: 84   SpO2: 97%   Weight: 97.5 kg (215 lb)   Height: 177.8 cm (70\")     General Appearance:    Alert, cooperative, no distress, appears stated age   Head:    Normocephalic, without obvious abnormality, atraumatic   Eyes:    conjunctiva/corneas clear   Nose:   Nares normal, septum midline, mucosa normal, no drainage   Throat:   Lips, teeth and gums normal   Neck:   Supple, symmetrical, trachea midline, no carotid    bruit or JVD   Lungs:     Clear to auscultation bilaterally, respirations unlabored   Chest Wall:    No tenderness or deformity    Heart:    Regular rate and rhythm, S1 and S2 normal, no murmur, rub   or gallop, normal carotid impulse bilaterally without bruit.   Abdomen:     Soft, non-tender   Extremities:   Extremities normal, atraumatic, no cyanosis or edema   Pulses:   2+ and symmetric all extremities   Skin:   Skin color, texture, turgor normal, no rashes or lesions       Diagnostic Data:  Procedures  Lab Results   Component Value Date    TRIG 78 07/14/2020    HDL 51 07/14/2020     Lab Results   Component Value Date    GLUCOSE 104 (H) 01/09/2020    BUN 11 01/09/2020    CREATININE 1.02 01/09/2020     01/09/2020    K 3.8 01/09/2020    CL 94 (L) 01/09/2020    CO2 27.0 01/09/2020     No results found for: HGBA1C  Lab Results   Component Value " Date    WBC 6.04 01/09/2020    HGB 16.9 01/09/2020    HCT 50.4 01/09/2020     01/09/2020       Allergies  No Known Allergies    Current Medications    Current Outpatient Medications:     metoprolol succinate XL (TOPROL-XL) 25 MG 24 hr tablet, Take 1 tablet by mouth Daily., Disp: 90 tablet, Rfl: 4          ROS  Review of Systems   Cardiovascular:  Positive for palpitations. Negative for chest pain, dyspnea on exertion and irregular heartbeat.   Respiratory:  Negative for cough, shortness of breath and wheezing.      SOCIAL HX  Social History     Socioeconomic History    Marital status:    Tobacco Use    Smoking status: Never    Smokeless tobacco: Never   Vaping Use    Vaping Use: Never used   Substance and Sexual Activity    Alcohol use: Yes     Alcohol/week: 1.0 standard drink     Types: 1 Shots of liquor per week     Comment: occas    Drug use: No    Sexual activity: Defer       FAMILY HX  Family History   Problem Relation Age of Onset    Breast cancer Mother     Gallbladder disease Mother     Depression Mother     Gallbladder disease Father     Depression Father     Diabetes Father     Asthma Sister              Jose Flores III, MD, FACC

## 2023-08-29 ENCOUNTER — HOSPITAL ENCOUNTER (OUTPATIENT)
Dept: MRI IMAGING | Facility: HOSPITAL | Age: 48
Discharge: HOME | End: 2023-08-29
Admitting: INTERNAL MEDICINE
Payer: COMMERCIAL

## 2023-08-29 PROCEDURE — 75561 CARDIAC MRI FOR MORPH W/DYE: CPT

## 2023-08-29 PROCEDURE — A9577 INJ MULTIHANCE: HCPCS | Performed by: INTERNAL MEDICINE

## 2023-08-29 PROCEDURE — 0 GADOBENATE DIMEGLUMINE 529 MG/ML SOLUTION: Performed by: INTERNAL MEDICINE

## 2023-08-29 RX ADMIN — GADOBENATE DIMEGLUMINE 8 ML: 529 INJECTION, SOLUTION INTRAVENOUS at 18:25

## 2023-08-29 RX ADMIN — GADOBENATE DIMEGLUMINE 20 ML: 529 INJECTION, SOLUTION INTRAVENOUS at 18:25

## 2023-09-18 ENCOUNTER — TELEPHONE (OUTPATIENT)
Dept: CARDIOLOGY | Facility: CLINIC | Age: 48
End: 2023-09-18
Payer: COMMERCIAL

## 2023-09-18 NOTE — TELEPHONE ENCOUNTER
----- Message from Jose Flores III, MD sent at 9/18/2023  8:45 AM EDT -----  MRI reveals normal heart structure and function.

## 2023-10-13 ENCOUNTER — TELEPHONE (OUTPATIENT)
Dept: CARDIOLOGY | Facility: CLINIC | Age: 48
End: 2023-10-13
Payer: COMMERCIAL

## 2023-10-13 NOTE — TELEPHONE ENCOUNTER
----- Message from Jose Flores III, MD sent at 10/13/2023 11:47 AM EDT -----  Normal cardiac MRI.  Very reassuring.

## 2024-03-20 NOTE — TELEPHONE ENCOUNTER
First contact with pt. Pt. Resting comfortably in stretcher eating and drinking with family at bedside. NAD. No needs expressed. Updated on plan of care.   Patient notified of message above from . Verbalized understanding.

## 2024-08-05 RX ORDER — METOPROLOL SUCCINATE 25 MG/1
25 TABLET, EXTENDED RELEASE ORAL DAILY
Qty: 90 TABLET | Refills: 4 | Status: SHIPPED | OUTPATIENT
Start: 2024-08-05

## 2024-08-13 ENCOUNTER — OFFICE VISIT (OUTPATIENT)
Dept: CARDIOLOGY | Facility: CLINIC | Age: 49
End: 2024-08-13
Payer: COMMERCIAL

## 2024-08-13 VITALS
BODY MASS INDEX: 29.91 KG/M2 | DIASTOLIC BLOOD PRESSURE: 78 MMHG | HEIGHT: 70 IN | OXYGEN SATURATION: 97 % | SYSTOLIC BLOOD PRESSURE: 140 MMHG | HEART RATE: 99 BPM | WEIGHT: 208.9 LBS

## 2024-08-13 DIAGNOSIS — E78.2 MIXED HYPERLIPIDEMIA: ICD-10-CM

## 2024-08-13 DIAGNOSIS — I49.3 PVC (PREMATURE VENTRICULAR CONTRACTION): Primary | ICD-10-CM

## 2024-08-13 PROCEDURE — 99213 OFFICE O/P EST LOW 20 MIN: CPT | Performed by: INTERNAL MEDICINE

## 2024-08-13 PROCEDURE — 93000 ELECTROCARDIOGRAM COMPLETE: CPT | Performed by: INTERNAL MEDICINE

## 2024-08-13 RX ORDER — ALPRAZOLAM 0.5 MG/1
0.5 TABLET ORAL NIGHTLY PRN
COMMUNITY

## 2024-08-13 RX ORDER — METOPROLOL SUCCINATE 25 MG/1
25 TABLET, EXTENDED RELEASE ORAL DAILY
Qty: 90 TABLET | Refills: 4 | Status: SHIPPED | OUTPATIENT
Start: 2024-08-13

## 2024-08-13 NOTE — PROGRESS NOTES
[facility] at [facility]  Office visit  Bereket Palmer Jr.  1975  [phone]    VISIT DATE:  8/13/2024      PCP: Jose Morse MD  [address] [address]    CC:  Chief Complaint   Patient presents with    PVC (premature ventricular contraction)    Mixed hyperlipidemia    Chest Pain    Palpitations       PROBLEM LIST:  Chest pains:   First episode 2003, with cardiac catheterization showing no evidence of coronary disease, normal LV function.   Recurrent episode 2010, with Medical management only.   Recurrent episodes, requiring ER visitation, 11/12/2015: Ruling out for an MI, no change in EKG.   December 2015: Normal stress echocardiogram.  Abnormal EKG.   H/O Mild obesity with recent unintentional weight loss.  Palpitations.  June 2017 Holter-occasional premature ventricular contractions.  July 2018 Holter-average heart rate 80 bpm, rare PVCs, rare PACs, nocturnal bradycardia.    Previous cardiac studies and procedures:  October 2019 coronary calcium score: 0    July 2020 24 Holter: Normal    September 2020 echo  Left ventricular systolic function is hyperdynamic (EF > 70).  Left ventricular wall thickness is consistent with mild concentric hypertrophy.    August 2023 cardiac MRI  1. Normal sized left ventricle with normal global systolic function (EF 65%).   2. No CMR evidence for LV scar (fibrosis or infarction).   3. Normal sized right ventricle with normal global systolic function (EF 60%).   4. No significant valvular abnormalities.     ASSESSMENT:   Diagnosis Plan   1. PVC (premature ventricular contraction)        2. Mixed hyperlipidemia            PLAN:  Symptomatic premature ventricular contractions: Limited burden of arrhythmia on most recent Holter.  Continue beta-blockade.     Abnormal EKG: Suspect EKG changes are normal variant in this young -American gentleman, reassuring cardiac MRI, no high risk clinical features.      Hyperlipidemia: Goal LDL  "less than 130.  Normal CRP, no significant elevation in LP(a), calcium score 0.  Encouraged predominant plant-based diet and regular exercise.  Annual lipid profile.    Subjective  49-year-old gentleman with a history of recurrent palpitations and atypical chest pain.   Blood pressures are running less than 130/80 mmHg.  Palpitations are well controlled on current dose of Toprol-XL.      PHYSICAL EXAMINATION:  Vitals:    08/13/24 0931   BP: 140/78   BP Location: Right arm   Patient Position: Sitting   Cuff Size: Adult   Pulse: 91   SpO2: 97%   Weight: 94.8 kg (208 lb 14.4 oz)   Height: 177.8 cm (70\")     General Appearance:    Alert, cooperative, no distress, appears stated age   Head:    Normocephalic, without obvious abnormality, atraumatic   Eyes:    conjunctiva/corneas clear   Nose:   Nares normal, septum midline, mucosa normal, no drainage   Throat:   Lips, teeth and gums normal   Neck:   Supple, symmetrical, trachea midline, no carotid    bruit or JVD   Lungs:     Clear to auscultation bilaterally, respirations unlabored   Chest Wall:    No tenderness or deformity    Heart:    Regular rate and rhythm, S1 and S2 normal, no murmur, rub   or gallop, normal carotid impulse bilaterally without bruit.   Abdomen:     Soft, non-tender   Extremities:   Extremities normal, atraumatic, no cyanosis or edema   Pulses:   2+ and symmetric all extremities   Skin:   Skin color, texture, turgor normal, no rashes or lesions       Diagnostic Data:    ECG 12 Lead    Date/Time: 8/13/2024 9:53 AM  Performed by: Jose Flores III, MD    Authorized by: Jose Flores III, MD  Comparison: compared with previous ECG from 7/12/2022  Similar to previous ECG  Rhythm: sinus rhythm  Other findings: T wave abnormality    Clinical impression: abnormal EKG        Lab Results   Component Value Date    TRIG 78 07/14/2020    HDL 51 07/14/2020     Lab Results   Component Value Date    GLUCOSE 104 (H) 01/09/2020    BUN 11 01/09/2020    CREATININE " "1.02 01/09/2020     01/09/2020    K 3.8 01/09/2020    CL 94 (L) 01/09/2020    CO2 27.0 01/09/2020     No results found for: \"HGBA1C\"  Lab Results   Component Value Date    WBC 6.04 01/09/2020    HGB 16.9 01/09/2020    HCT 50.4 01/09/2020     01/09/2020       Allergies  No Known Allergies    Current Medications    Current Outpatient Medications:     ALPRAZolam (XANAX) 0.5 MG tablet, Take 1 tablet by mouth At Night As Needed for Anxiety., Disp: , Rfl:     metoprolol succinate XL (TOPROL-XL) 25 MG 24 hr tablet, TAKE 1 TABLET BY MOUTH EVERY DAY, Disp: 90 tablet, Rfl: 4          ROS  Review of Systems   Cardiovascular:  Positive for palpitations. Negative for chest pain, dyspnea on exertion and irregular heartbeat.   Respiratory:  Negative for cough, shortness of breath and wheezing.        SOCIAL HX  Social History     Socioeconomic History    Marital status:    Tobacco Use    Smoking status: Never     Passive exposure: Past    Smokeless tobacco: Never   Vaping Use    Vaping status: Never Used   Substance and Sexual Activity    Alcohol use: Yes     Alcohol/week: 1.0 standard drink of alcohol     Types: 1 Shots of liquor per week     Comment: occas    Drug use: No    Sexual activity: Defer       FAMILY HX  Family History   Problem Relation Age of Onset    Breast cancer Mother     Gallbladder disease Mother     Depression Mother     Gallbladder disease Father     Depression Father     Diabetes Father     Asthma Sister              Jose Flores III, MD, FACC      "

## 2024-12-18 ENCOUNTER — TRANSCRIBE ORDERS (OUTPATIENT)
Dept: GENERAL RADIOLOGY | Facility: HOSPITAL | Age: 49
End: 2024-12-18
Payer: COMMERCIAL

## 2024-12-18 DIAGNOSIS — M25.811 SHOULDER IMPINGEMENT, RIGHT: Primary | ICD-10-CM

## 2024-12-19 ENCOUNTER — HOSPITAL ENCOUNTER (OUTPATIENT)
Dept: GENERAL RADIOLOGY | Facility: HOSPITAL | Age: 49
Discharge: HOME | End: 2024-12-19
Admitting: FAMILY MEDICINE
Payer: COMMERCIAL

## 2024-12-19 DIAGNOSIS — M25.811 SHOULDER IMPINGEMENT, RIGHT: ICD-10-CM

## 2024-12-19 PROCEDURE — 73030 X-RAY EXAM OF SHOULDER: CPT

## 2024-12-24 ENCOUNTER — TRANSCRIBE ORDERS (OUTPATIENT)
Dept: ADMINISTRATIVE | Facility: HOSPITAL | Age: 49
End: 2024-12-24
Payer: COMMERCIAL

## 2024-12-24 DIAGNOSIS — M25.811 SHOULDER IMPINGEMENT, RIGHT: Primary | ICD-10-CM

## 2025-08-26 ENCOUNTER — OFFICE VISIT (OUTPATIENT)
Dept: CARDIOLOGY | Facility: CLINIC | Age: 50
End: 2025-08-26
Payer: COMMERCIAL

## 2025-08-26 VITALS
OXYGEN SATURATION: 97 % | HEIGHT: 70 IN | WEIGHT: 211 LBS | BODY MASS INDEX: 30.21 KG/M2 | DIASTOLIC BLOOD PRESSURE: 88 MMHG | HEART RATE: 67 BPM | SYSTOLIC BLOOD PRESSURE: 142 MMHG

## 2025-08-26 DIAGNOSIS — E78.2 MIXED HYPERLIPIDEMIA: Primary | ICD-10-CM

## 2025-08-26 DIAGNOSIS — R00.2 PALPITATIONS: ICD-10-CM

## 2025-08-26 PROCEDURE — 99214 OFFICE O/P EST MOD 30 MIN: CPT | Performed by: INTERNAL MEDICINE

## 2025-08-26 RX ORDER — METOPROLOL SUCCINATE 25 MG/1
25 TABLET, EXTENDED RELEASE ORAL DAILY
Qty: 90 TABLET | Refills: 4 | Status: SHIPPED | OUTPATIENT
Start: 2025-08-26

## 2025-08-26 RX ORDER — ESCITALOPRAM OXALATE 5 MG/1
5 TABLET ORAL DAILY
COMMUNITY
Start: 2025-07-22